# Patient Record
Sex: FEMALE | Race: AMERICAN INDIAN OR ALASKA NATIVE | NOT HISPANIC OR LATINO | URBAN - METROPOLITAN AREA
[De-identification: names, ages, dates, MRNs, and addresses within clinical notes are randomized per-mention and may not be internally consistent; named-entity substitution may affect disease eponyms.]

---

## 2020-03-01 ENCOUNTER — OUTPATIENT (OUTPATIENT)
Dept: OUTPATIENT SERVICES | Facility: HOSPITAL | Age: 85
LOS: 1 days | End: 2020-03-01
Payer: MEDICARE

## 2020-03-01 DIAGNOSIS — Z90.5 ACQUIRED ABSENCE OF KIDNEY: Chronic | ICD-10-CM

## 2020-03-01 PROCEDURE — G9001: CPT

## 2020-03-25 ENCOUNTER — INPATIENT (INPATIENT)
Facility: HOSPITAL | Age: 85
LOS: 8 days | Discharge: NURSING, REHAB OR LONG-TERM CARE | DRG: 871 | End: 2020-04-03
Attending: INTERNAL MEDICINE | Admitting: INTERNAL MEDICINE
Payer: MEDICARE

## 2020-03-25 VITALS
DIASTOLIC BLOOD PRESSURE: 80 MMHG | OXYGEN SATURATION: 100 % | RESPIRATION RATE: 17 BRPM | SYSTOLIC BLOOD PRESSURE: 160 MMHG | TEMPERATURE: 102 F | HEART RATE: 115 BPM | WEIGHT: 119.93 LBS

## 2020-03-25 DIAGNOSIS — Z90.5 ACQUIRED ABSENCE OF KIDNEY: Chronic | ICD-10-CM

## 2020-03-25 LAB
ALBUMIN SERPL ELPH-MCNC: 2.6 G/DL — LOW (ref 3.5–5)
ALP SERPL-CCNC: 73 U/L — SIGNIFICANT CHANGE UP (ref 40–120)
ALT FLD-CCNC: 20 U/L DA — SIGNIFICANT CHANGE UP (ref 10–60)
ANION GAP SERPL CALC-SCNC: 8 MMOL/L — SIGNIFICANT CHANGE UP (ref 5–17)
APTT BLD: 30.7 SEC — SIGNIFICANT CHANGE UP (ref 27.5–36.3)
AST SERPL-CCNC: 34 U/L — SIGNIFICANT CHANGE UP (ref 10–40)
BASOPHILS # BLD AUTO: 0.01 K/UL — SIGNIFICANT CHANGE UP (ref 0–0.2)
BASOPHILS NFR BLD AUTO: 0.2 % — SIGNIFICANT CHANGE UP (ref 0–2)
BILIRUB SERPL-MCNC: 0.3 MG/DL — SIGNIFICANT CHANGE UP (ref 0.2–1.2)
BUN SERPL-MCNC: 19 MG/DL — HIGH (ref 7–18)
CALCIUM SERPL-MCNC: 8.6 MG/DL — SIGNIFICANT CHANGE UP (ref 8.4–10.5)
CHLORIDE SERPL-SCNC: 104 MMOL/L — SIGNIFICANT CHANGE UP (ref 96–108)
CO2 SERPL-SCNC: 25 MMOL/L — SIGNIFICANT CHANGE UP (ref 22–31)
CREAT SERPL-MCNC: 1.51 MG/DL — HIGH (ref 0.5–1.3)
EOSINOPHIL # BLD AUTO: 0 K/UL — SIGNIFICANT CHANGE UP (ref 0–0.5)
EOSINOPHIL NFR BLD AUTO: 0 % — SIGNIFICANT CHANGE UP (ref 0–6)
FLU A RESULT: SIGNIFICANT CHANGE UP
FLU A RESULT: SIGNIFICANT CHANGE UP
FLUAV AG NPH QL: SIGNIFICANT CHANGE UP
FLUBV AG NPH QL: SIGNIFICANT CHANGE UP
GLUCOSE SERPL-MCNC: 202 MG/DL — HIGH (ref 70–99)
HCT VFR BLD CALC: 34.2 % — LOW (ref 34.5–45)
HGB BLD-MCNC: 11.1 G/DL — LOW (ref 11.5–15.5)
IMM GRANULOCYTES NFR BLD AUTO: 0.4 % — SIGNIFICANT CHANGE UP (ref 0–1.5)
INR BLD: 0.99 RATIO — SIGNIFICANT CHANGE UP (ref 0.88–1.16)
LACTATE SERPL-SCNC: 2.3 MMOL/L — HIGH (ref 0.7–2)
LYMPHOCYTES # BLD AUTO: 0.89 K/UL — LOW (ref 1–3.3)
LYMPHOCYTES # BLD AUTO: 17.4 % — SIGNIFICANT CHANGE UP (ref 13–44)
MCHC RBC-ENTMCNC: 29.3 PG — SIGNIFICANT CHANGE UP (ref 27–34)
MCHC RBC-ENTMCNC: 32.5 GM/DL — SIGNIFICANT CHANGE UP (ref 32–36)
MCV RBC AUTO: 90.2 FL — SIGNIFICANT CHANGE UP (ref 80–100)
MONOCYTES # BLD AUTO: 0.44 K/UL — SIGNIFICANT CHANGE UP (ref 0–0.9)
MONOCYTES NFR BLD AUTO: 8.6 % — SIGNIFICANT CHANGE UP (ref 2–14)
NEUTROPHILS # BLD AUTO: 3.75 K/UL — SIGNIFICANT CHANGE UP (ref 1.8–7.4)
NEUTROPHILS NFR BLD AUTO: 73.4 % — SIGNIFICANT CHANGE UP (ref 43–77)
NRBC # BLD: 0 /100 WBCS — SIGNIFICANT CHANGE UP (ref 0–0)
NT-PROBNP SERPL-SCNC: 716 PG/ML — HIGH (ref 0–450)
PLATELET # BLD AUTO: 243 K/UL — SIGNIFICANT CHANGE UP (ref 150–400)
POTASSIUM SERPL-MCNC: 4 MMOL/L — SIGNIFICANT CHANGE UP (ref 3.5–5.3)
POTASSIUM SERPL-SCNC: 4 MMOL/L — SIGNIFICANT CHANGE UP (ref 3.5–5.3)
PROT SERPL-MCNC: 6.5 G/DL — SIGNIFICANT CHANGE UP (ref 6–8.3)
PROTHROM AB SERPL-ACNC: 11.2 SEC — SIGNIFICANT CHANGE UP (ref 10–12.9)
RBC # BLD: 3.79 M/UL — LOW (ref 3.8–5.2)
RBC # FLD: 13.3 % — SIGNIFICANT CHANGE UP (ref 10.3–14.5)
RSV RESULT: SIGNIFICANT CHANGE UP
RSV RNA RESP QL NAA+PROBE: SIGNIFICANT CHANGE UP
SODIUM SERPL-SCNC: 137 MMOL/L — SIGNIFICANT CHANGE UP (ref 135–145)
TROPONIN I SERPL-MCNC: 0.01 NG/ML — SIGNIFICANT CHANGE UP (ref 0–0.04)
WBC # BLD: 5.11 K/UL — SIGNIFICANT CHANGE UP (ref 3.8–10.5)
WBC # FLD AUTO: 5.11 K/UL — SIGNIFICANT CHANGE UP (ref 3.8–10.5)

## 2020-03-25 RX ORDER — CEFTRIAXONE 500 MG/1
1000 INJECTION, POWDER, FOR SOLUTION INTRAMUSCULAR; INTRAVENOUS ONCE
Refills: 0 | Status: DISCONTINUED | OUTPATIENT
Start: 2020-03-25 | End: 2020-03-25

## 2020-03-25 RX ORDER — ACETAMINOPHEN 500 MG
650 TABLET ORAL ONCE
Refills: 0 | Status: COMPLETED | OUTPATIENT
Start: 2020-03-25 | End: 2020-03-26

## 2020-03-25 RX ORDER — AZITHROMYCIN 500 MG/1
500 TABLET, FILM COATED ORAL ONCE
Refills: 0 | Status: DISCONTINUED | OUTPATIENT
Start: 2020-03-25 | End: 2020-03-25

## 2020-03-25 RX ORDER — CEFEPIME 1 G/1
2000 INJECTION, POWDER, FOR SOLUTION INTRAMUSCULAR; INTRAVENOUS ONCE
Refills: 0 | Status: COMPLETED | OUTPATIENT
Start: 2020-03-25 | End: 2020-03-25

## 2020-03-25 RX ORDER — VANCOMYCIN HCL 1 G
1000 VIAL (EA) INTRAVENOUS ONCE
Refills: 0 | Status: COMPLETED | OUTPATIENT
Start: 2020-03-25 | End: 2020-03-25

## 2020-03-25 RX ORDER — SODIUM CHLORIDE 9 MG/ML
1700 INJECTION INTRAMUSCULAR; INTRAVENOUS; SUBCUTANEOUS ONCE
Refills: 0 | Status: COMPLETED | OUTPATIENT
Start: 2020-03-25 | End: 2020-03-25

## 2020-03-25 NOTE — ED PROVIDER NOTE - OBJECTIVE STATEMENT
86yoF with h/o HTN, DM on insulin, dementia, MI 4 yrs ago s/p stent, one kidney s/p nephrectomy in her 50's, presents for weakness and fever. D/w grandson Bertrand Paulino who reports she fell down a few days ago and landed on her bottom, daughter heard thud and seemed fine, +head trauma, no LOC, but having trouble walking since then. Fever on Monday, then today fever came back and today she has been febrile to 101 (no Tylenol), shaky, not taking her medications, poorly verbal. Her aide called out sick starting Saturday. Denies new cough (chronic), vomiting since Monday, diarrhea. Pt herself is nonverbal and does not provide hx.  Meds: ASA, Plavix, Januvia, metformin, statin, metoprolol, diltiazem, losartan, Synthroid, omeprazole, iron

## 2020-03-25 NOTE — ED PROVIDER NOTE - CRITICAL CARE PROVIDED
documentation/direct patient care (not related to procedure)/telephone consultation with the patient's family/interpretation of diagnostic studies/additional history taking

## 2020-03-25 NOTE — ED PROVIDER NOTE - CLINICAL SUMMARY MEDICAL DECISION MAKING FREE TEXT BOX
Abdomen benign with low suspicion for acute process. Pt LYNNE spontaneously and no meningeal signs with low suspicion for meningitis. No e/o cellulitis, endocarditis. UA pending. Abdomen benign with low suspicion for acute process. Pt LYNNE spontaneously and no meningeal signs with low suspicion for meningitis. No e/o cellulitis, endocarditis. UA pending. Given fluids, abx. Signed off care to Dr. IWONA Renteria who will f/u CT's and reassess.

## 2020-03-25 NOTE — ED PROVIDER NOTE - PHYSICAL EXAMINATION
Febrile, hemodynamically stable, saturating well on RA  NAD, laying sleeping in bed, no WOB  Head NCAT  EOMI grossly, anicteric  MM dry  RRR, nml S1/S2, no m/r/g  Lungs CTAB, no w/r/r  Abd soft, NT, ND, nml BS, no rebound or guarding  Sleeping, arousable, intermittently verbal but not coordinated/directed, CN's 3-12 grossly intact  LYNNE spontaneously, no leg cyanosis or edema  Skin warm, dry, no rashes or hives

## 2020-03-25 NOTE — ED PROVIDER NOTE - PROGRESS NOTE DETAILS
Signed off care to Dr. IWONA Renteria who will f/u CT's and reassess. Myra: ct signifiacnt for atypical pna. will admit.

## 2020-03-26 DIAGNOSIS — J18.9 PNEUMONIA, UNSPECIFIED ORGANISM: ICD-10-CM

## 2020-03-26 DIAGNOSIS — I25.10 ATHEROSCLEROTIC HEART DISEASE OF NATIVE CORONARY ARTERY WITHOUT ANGINA PECTORIS: ICD-10-CM

## 2020-03-26 DIAGNOSIS — E03.9 HYPOTHYROIDISM, UNSPECIFIED: ICD-10-CM

## 2020-03-26 DIAGNOSIS — R29.6 REPEATED FALLS: ICD-10-CM

## 2020-03-26 DIAGNOSIS — A41.9 SEPSIS, UNSPECIFIED ORGANISM: ICD-10-CM

## 2020-03-26 DIAGNOSIS — F03.90 UNSPECIFIED DEMENTIA WITHOUT BEHAVIORAL DISTURBANCE: ICD-10-CM

## 2020-03-26 DIAGNOSIS — N17.9 ACUTE KIDNEY FAILURE, UNSPECIFIED: ICD-10-CM

## 2020-03-26 DIAGNOSIS — I10 ESSENTIAL (PRIMARY) HYPERTENSION: ICD-10-CM

## 2020-03-26 DIAGNOSIS — E11.69 TYPE 2 DIABETES MELLITUS WITH OTHER SPECIFIED COMPLICATION: ICD-10-CM

## 2020-03-26 LAB
ANION GAP SERPL CALC-SCNC: 9 MMOL/L — SIGNIFICANT CHANGE UP (ref 5–17)
APPEARANCE UR: ABNORMAL
BILIRUB UR-MCNC: NEGATIVE — SIGNIFICANT CHANGE UP
BUN SERPL-MCNC: 18 MG/DL — SIGNIFICANT CHANGE UP (ref 7–18)
CALCIUM SERPL-MCNC: 8.7 MG/DL — SIGNIFICANT CHANGE UP (ref 8.4–10.5)
CHLORIDE SERPL-SCNC: 107 MMOL/L — SIGNIFICANT CHANGE UP (ref 96–108)
CO2 SERPL-SCNC: 22 MMOL/L — SIGNIFICANT CHANGE UP (ref 22–31)
COLOR SPEC: YELLOW — SIGNIFICANT CHANGE UP
CREAT SERPL-MCNC: 1.23 MG/DL — SIGNIFICANT CHANGE UP (ref 0.5–1.3)
CRP SERPL-MCNC: 3.44 MG/DL — HIGH (ref 0–0.4)
DIFF PNL FLD: NEGATIVE — SIGNIFICANT CHANGE UP
ERYTHROCYTE [SEDIMENTATION RATE] IN BLOOD: 39 MM/HR — HIGH (ref 0–20)
FERRITIN SERPL-MCNC: 809 NG/ML — HIGH (ref 15–150)
GLUCOSE BLDC GLUCOMTR-MCNC: 137 MG/DL — HIGH (ref 70–99)
GLUCOSE BLDC GLUCOMTR-MCNC: 173 MG/DL — HIGH (ref 70–99)
GLUCOSE BLDC GLUCOMTR-MCNC: 179 MG/DL — HIGH (ref 70–99)
GLUCOSE BLDC GLUCOMTR-MCNC: 215 MG/DL — HIGH (ref 70–99)
GLUCOSE SERPL-MCNC: 171 MG/DL — HIGH (ref 70–99)
GLUCOSE UR QL: 50 MG/DL
HCT VFR BLD CALC: 35.2 % — SIGNIFICANT CHANGE UP (ref 34.5–45)
HGB BLD-MCNC: 11.6 G/DL — SIGNIFICANT CHANGE UP (ref 11.5–15.5)
KETONES UR-MCNC: ABNORMAL
LACTATE SERPL-SCNC: 1.8 MMOL/L — SIGNIFICANT CHANGE UP (ref 0.7–2)
LDH SERPL L TO P-CCNC: 225 U/L — SIGNIFICANT CHANGE UP (ref 120–225)
LEUKOCYTE ESTERASE UR-ACNC: NEGATIVE — SIGNIFICANT CHANGE UP
MAGNESIUM SERPL-MCNC: 2 MG/DL — SIGNIFICANT CHANGE UP (ref 1.6–2.6)
MCHC RBC-ENTMCNC: 29.1 PG — SIGNIFICANT CHANGE UP (ref 27–34)
MCHC RBC-ENTMCNC: 33 GM/DL — SIGNIFICANT CHANGE UP (ref 32–36)
MCV RBC AUTO: 88.2 FL — SIGNIFICANT CHANGE UP (ref 80–100)
NITRITE UR-MCNC: NEGATIVE — SIGNIFICANT CHANGE UP
NRBC # BLD: 0 /100 WBCS — SIGNIFICANT CHANGE UP (ref 0–0)
PH UR: 6 — SIGNIFICANT CHANGE UP (ref 5–8)
PHOSPHATE SERPL-MCNC: 3.2 MG/DL — SIGNIFICANT CHANGE UP (ref 2.5–4.5)
PLATELET # BLD AUTO: 246 K/UL — SIGNIFICANT CHANGE UP (ref 150–400)
POTASSIUM SERPL-MCNC: 4.2 MMOL/L — SIGNIFICANT CHANGE UP (ref 3.5–5.3)
POTASSIUM SERPL-SCNC: 4.2 MMOL/L — SIGNIFICANT CHANGE UP (ref 3.5–5.3)
PROT UR-MCNC: 100
RBC # BLD: 3.99 M/UL — SIGNIFICANT CHANGE UP (ref 3.8–5.2)
RBC # FLD: 13.2 % — SIGNIFICANT CHANGE UP (ref 10.3–14.5)
SARS-COV-2 RNA SPEC QL NAA+PROBE: DETECTED
SODIUM SERPL-SCNC: 138 MMOL/L — SIGNIFICANT CHANGE UP (ref 135–145)
SP GR SPEC: 1.01 — SIGNIFICANT CHANGE UP (ref 1.01–1.02)
TSH SERPL-MCNC: 0.04 UU/ML — LOW (ref 0.34–4.82)
UROBILINOGEN FLD QL: NEGATIVE — SIGNIFICANT CHANGE UP
WBC # BLD: 5.4 K/UL — SIGNIFICANT CHANGE UP (ref 3.8–10.5)
WBC # FLD AUTO: 5.4 K/UL — SIGNIFICANT CHANGE UP (ref 3.8–10.5)

## 2020-03-26 PROCEDURE — 99285 EMERGENCY DEPT VISIT HI MDM: CPT | Mod: CS

## 2020-03-26 PROCEDURE — 99223 1ST HOSP IP/OBS HIGH 75: CPT

## 2020-03-26 PROCEDURE — 70450 CT HEAD/BRAIN W/O DYE: CPT | Mod: 26

## 2020-03-26 PROCEDURE — 71250 CT THORAX DX C-: CPT | Mod: 26

## 2020-03-26 PROCEDURE — 71045 X-RAY EXAM CHEST 1 VIEW: CPT | Mod: 26

## 2020-03-26 PROCEDURE — 72125 CT NECK SPINE W/O DYE: CPT | Mod: 26

## 2020-03-26 PROCEDURE — 74176 CT ABD & PELVIS W/O CONTRAST: CPT | Mod: 26

## 2020-03-26 RX ORDER — ASCORBIC ACID 60 MG
500 TABLET,CHEWABLE ORAL
Refills: 0 | Status: DISCONTINUED | OUTPATIENT
Start: 2020-03-26 | End: 2020-04-03

## 2020-03-26 RX ORDER — LEVOTHYROXINE SODIUM 125 MCG
75 TABLET ORAL DAILY
Refills: 0 | Status: DISCONTINUED | OUTPATIENT
Start: 2020-03-26 | End: 2020-03-26

## 2020-03-26 RX ORDER — CEFTRIAXONE 500 MG/1
INJECTION, POWDER, FOR SOLUTION INTRAMUSCULAR; INTRAVENOUS
Refills: 0 | Status: DISCONTINUED | OUTPATIENT
Start: 2020-03-26 | End: 2020-03-29

## 2020-03-26 RX ORDER — LOSARTAN POTASSIUM 100 MG/1
50 TABLET, FILM COATED ORAL DAILY
Refills: 0 | Status: DISCONTINUED | OUTPATIENT
Start: 2020-03-26 | End: 2020-04-03

## 2020-03-26 RX ORDER — AZITHROMYCIN 500 MG/1
500 TABLET, FILM COATED ORAL EVERY 24 HOURS
Refills: 0 | Status: COMPLETED | OUTPATIENT
Start: 2020-03-26 | End: 2020-03-29

## 2020-03-26 RX ORDER — OMEPRAZOLE 10 MG/1
1 CAPSULE, DELAYED RELEASE ORAL
Qty: 0 | Refills: 0 | DISCHARGE

## 2020-03-26 RX ORDER — METOPROLOL TARTRATE 50 MG
50 TABLET ORAL DAILY
Refills: 0 | Status: DISCONTINUED | OUTPATIENT
Start: 2020-03-26 | End: 2020-03-26

## 2020-03-26 RX ORDER — PANTOPRAZOLE SODIUM 20 MG/1
40 TABLET, DELAYED RELEASE ORAL
Refills: 0 | Status: DISCONTINUED | OUTPATIENT
Start: 2020-03-26 | End: 2020-03-26

## 2020-03-26 RX ORDER — PANTOPRAZOLE SODIUM 20 MG/1
40 TABLET, DELAYED RELEASE ORAL DAILY
Refills: 0 | Status: DISCONTINUED | OUTPATIENT
Start: 2020-03-26 | End: 2020-04-03

## 2020-03-26 RX ORDER — DONEPEZIL HYDROCHLORIDE 10 MG/1
5 TABLET, FILM COATED ORAL AT BEDTIME
Refills: 0 | Status: DISCONTINUED | OUTPATIENT
Start: 2020-03-26 | End: 2020-04-03

## 2020-03-26 RX ORDER — ATORVASTATIN CALCIUM 80 MG/1
20 TABLET, FILM COATED ORAL AT BEDTIME
Refills: 0 | Status: DISCONTINUED | OUTPATIENT
Start: 2020-03-26 | End: 2020-04-03

## 2020-03-26 RX ORDER — DONEPEZIL HYDROCHLORIDE 10 MG/1
1 TABLET, FILM COATED ORAL
Qty: 0 | Refills: 0 | DISCHARGE

## 2020-03-26 RX ORDER — SODIUM CHLORIDE 9 MG/ML
1000 INJECTION, SOLUTION INTRAVENOUS
Refills: 0 | Status: DISCONTINUED | OUTPATIENT
Start: 2020-03-26 | End: 2020-03-30

## 2020-03-26 RX ORDER — LEVOTHYROXINE SODIUM 125 MCG
88 TABLET ORAL DAILY
Refills: 0 | Status: DISCONTINUED | OUTPATIENT
Start: 2020-03-26 | End: 2020-03-26

## 2020-03-26 RX ORDER — LATANOPROST 0.05 MG/ML
1 SOLUTION/ DROPS OPHTHALMIC; TOPICAL AT BEDTIME
Refills: 0 | Status: DISCONTINUED | OUTPATIENT
Start: 2020-03-26 | End: 2020-04-03

## 2020-03-26 RX ORDER — DORZOLAMIDE HYDROCHLORIDE TIMOLOL MALEATE 20; 5 MG/ML; MG/ML
1 SOLUTION/ DROPS OPHTHALMIC
Refills: 0 | Status: DISCONTINUED | OUTPATIENT
Start: 2020-03-26 | End: 2020-04-03

## 2020-03-26 RX ORDER — LEVOTHYROXINE SODIUM 125 MCG
100 TABLET ORAL DAILY
Refills: 0 | Status: DISCONTINUED | OUTPATIENT
Start: 2020-03-26 | End: 2020-03-26

## 2020-03-26 RX ORDER — CLOPIDOGREL BISULFATE 75 MG/1
75 TABLET, FILM COATED ORAL DAILY
Refills: 0 | Status: DISCONTINUED | OUTPATIENT
Start: 2020-03-26 | End: 2020-04-03

## 2020-03-26 RX ORDER — HEPARIN SODIUM 5000 [USP'U]/ML
5000 INJECTION INTRAVENOUS; SUBCUTANEOUS EVERY 8 HOURS
Refills: 0 | Status: DISCONTINUED | OUTPATIENT
Start: 2020-03-26 | End: 2020-04-03

## 2020-03-26 RX ORDER — CEFTRIAXONE 500 MG/1
1000 INJECTION, POWDER, FOR SOLUTION INTRAMUSCULAR; INTRAVENOUS EVERY 24 HOURS
Refills: 0 | Status: DISCONTINUED | OUTPATIENT
Start: 2020-03-27 | End: 2020-03-29

## 2020-03-26 RX ORDER — METOPROLOL TARTRATE 50 MG
5 TABLET ORAL EVERY 6 HOURS
Refills: 0 | Status: DISCONTINUED | OUTPATIENT
Start: 2020-03-26 | End: 2020-04-03

## 2020-03-26 RX ORDER — LOSARTAN POTASSIUM 100 MG/1
50 TABLET, FILM COATED ORAL DAILY
Refills: 0 | Status: DISCONTINUED | OUTPATIENT
Start: 2020-03-26 | End: 2020-03-26

## 2020-03-26 RX ORDER — LEVOTHYROXINE SODIUM 125 MCG
45 TABLET ORAL AT BEDTIME
Refills: 0 | Status: DISCONTINUED | OUTPATIENT
Start: 2020-03-26 | End: 2020-03-27

## 2020-03-26 RX ORDER — CEFTRIAXONE 500 MG/1
1000 INJECTION, POWDER, FOR SOLUTION INTRAMUSCULAR; INTRAVENOUS ONCE
Refills: 0 | Status: COMPLETED | OUTPATIENT
Start: 2020-03-26 | End: 2020-03-26

## 2020-03-26 RX ORDER — AMLODIPINE BESYLATE 2.5 MG/1
5 TABLET ORAL DAILY
Refills: 0 | Status: DISCONTINUED | OUTPATIENT
Start: 2020-03-26 | End: 2020-03-26

## 2020-03-26 RX ORDER — INSULIN LISPRO 100/ML
VIAL (ML) SUBCUTANEOUS
Refills: 0 | Status: DISCONTINUED | OUTPATIENT
Start: 2020-03-26 | End: 2020-04-03

## 2020-03-26 RX ORDER — AZITHROMYCIN 500 MG/1
500 TABLET, FILM COATED ORAL DAILY
Refills: 0 | Status: DISCONTINUED | OUTPATIENT
Start: 2020-03-26 | End: 2020-03-26

## 2020-03-26 RX ADMIN — SODIUM CHLORIDE 1700 MILLILITER(S): 9 INJECTION INTRAMUSCULAR; INTRAVENOUS; SUBCUTANEOUS at 01:36

## 2020-03-26 RX ADMIN — Medication 2: at 17:36

## 2020-03-26 RX ADMIN — Medication 5 MILLIGRAM(S): at 19:07

## 2020-03-26 RX ADMIN — Medication 1: at 09:01

## 2020-03-26 RX ADMIN — CEFTRIAXONE 100 MILLIGRAM(S): 500 INJECTION, POWDER, FOR SOLUTION INTRAMUSCULAR; INTRAVENOUS at 08:49

## 2020-03-26 RX ADMIN — Medication 5 MILLIGRAM(S): at 14:50

## 2020-03-26 RX ADMIN — AZITHROMYCIN 255 MILLIGRAM(S): 500 TABLET, FILM COATED ORAL at 14:49

## 2020-03-26 RX ADMIN — HEPARIN SODIUM 5000 UNIT(S): 5000 INJECTION INTRAVENOUS; SUBCUTANEOUS at 21:44

## 2020-03-26 RX ADMIN — Medication 650 MILLIGRAM(S): at 02:30

## 2020-03-26 RX ADMIN — CEFEPIME 100 MILLIGRAM(S): 1 INJECTION, POWDER, FOR SOLUTION INTRAMUSCULAR; INTRAVENOUS at 01:36

## 2020-03-26 RX ADMIN — Medication 250 MILLIGRAM(S): at 01:37

## 2020-03-26 RX ADMIN — Medication 45 MICROGRAM(S): at 23:20

## 2020-03-26 RX ADMIN — Medication 650 MILLIGRAM(S): at 01:37

## 2020-03-26 NOTE — H&P ADULT - NSHPPHYSICALEXAM_GEN_ALL_CORE
Vital Signs Last 24 Hrs  T(C): 37.1 (26 Mar 2020 06:24), Max: 38.9 (25 Mar 2020 20:07)  T(F): 98.8 (26 Mar 2020 06:24), Max: 102 (25 Mar 2020 20:07)  HR: 96 (26 Mar 2020 06:24) (96 - 115)  BP: 143/66 (26 Mar 2020 06:24) (143/66 - 160/80)  BP(mean): --  RR: 18 (26 Mar 2020 06:24) (17 - 18)  SpO2: 99% (26 Mar 2020 06:24) (96% - 100%)    GEN: NAD  HEENT- normocephalic; mouth moist  CVS- S1S2+  LUNGS- clear to auscultation; no wheezing  ABD: Soft , nontender, nondistended, Bowel sounds are present  EXTREMITY: no calf tenderness, no cyanosis, no edema  NEURO: AAOx3; non focal neurologic exam; cranial nerves grossly intact  PSYCH: normal affect and behavior  BACK: no swelling or mass;   VASCULAR: ++ distal peripheral pulses  SKIN: warm and dry.

## 2020-03-26 NOTE — H&P ADULT - PROBLEM SELECTOR PLAN 1
1. CAP- Empiric abx, f/u COVID19, ferritin, LDH, CRP, RVP. Will defer ID and pulmonology consults to day team. Appreciate cardiology consult.

## 2020-03-26 NOTE — PATIENT PROFILE ADULT - LANGUAGE ASSISTANCE NEEDED
No-Patient/Caregiver offered and refused free interpretation services./Pt. is confused. Unable to assess

## 2020-03-26 NOTE — H&P ADULT - NSICDXPASTMEDICALHX_GEN_ALL_CORE_FT
PAST MEDICAL HISTORY:  DM (diabetes mellitus)     HTN (hypertension)     Hypothyroid     Stented coronary artery

## 2020-03-26 NOTE — H&P ADULT - PROBLEM SELECTOR PLAN 9
Avoid anticholinergics, benzodiazepines, limit lines/tubes/tethers/restraints, encourage frequent reorientation/reassurance by staff, encourage good sleep hygiene, adequate lighting

## 2020-03-26 NOTE — H&P ADULT - NSHPLABSRESULTS_GEN_ALL_CORE
Labs Reviewed:                         11.1   5.11  )-----------( 243      ( 25 Mar 2020 22:33 )             34.2         137  |  104  |  19<H>  ----------------------------<  202<H>  4.0   |  25  |  1.51<H>    Ca    8.6      25 Mar 2020 22:33    TPro  6.5  /  Alb  2.6<L>  /  TBili  0.3  /  DBili  x   /  AST  34  /  ALT  20  /  AlkPhos  73      CARDIAC MARKERS ( 25 Mar 2020 22:33 )  0.015 ng/mL / x     / x     / x     / x          Urinalysis Basic - ( 26 Mar 2020 05:40 )    Color: Yellow / Appearance: Slightly Turbid / S.015 / pH: x  Gluc: x / Ketone: Trace  / Bili: Negative / Urobili: Negative   Blood: x / Protein: 100 / Nitrite: Negative   Leuk Esterase: Negative / RBC: 0-2 /HPF / WBC 0-2 /HPF   Sq Epi: x / Non Sq Epi: Few /HPF / Bacteria: Moderate /HPF      PT/INR - ( 25 Mar 2020 22:33 )   PT: 11.2 sec;   INR: 0.99 ratio         PTT - ( 25 Mar 2020 22:33 )  PTT:30.7 sec  BNP: Serum Pro-Brain Natriuretic Peptide: 716 pg/mL ( @ 22:33)    MEDICATIONS  (STANDING):  amLODIPine   Tablet 5 milliGRAM(s) Oral daily  atorvastatin 20 milliGRAM(s) Oral at bedtime  azithromycin   Tablet 500 milliGRAM(s) Oral daily  cefTRIAXone   IVPB 1000 milliGRAM(s) IV Intermittent once  cefTRIAXone   IVPB      clopidogrel Tablet 75 milliGRAM(s) Oral daily  dorzolamide 2%/timolol 0.5% Ophthalmic Solution 1 Drop(s) Both EYES two times a day  heparin  Injectable 5000 Unit(s) SubCutaneous every 8 hours  insulin lispro (HumaLOG) corrective regimen sliding scale   SubCutaneous three times a day before meals  latanoprost 0.005% Ophthalmic Solution 1 Drop(s) Both EYES at bedtime  levothyroxine 100 MICROGram(s) Oral daily  losartan 50 milliGRAM(s) Oral daily  metoprolol succinate ER 50 milliGRAM(s) Oral daily  pantoprazole    Tablet 40 milliGRAM(s) Oral before breakfast

## 2020-03-26 NOTE — H&P ADULT - HISTORY OF PRESENT ILLNESS
87 y/o F with CAD s/p PCI, DM, dementia, glaucoma presenting with generalized weakness and recurrent falls. Patient not interacting with me on exam, hx taken from daughter and chart review as result. Patient has had generalized weakness with recurrent falls. Daughter also noticed shaking chills but denied fever specifically, however febrile on admission. No new medications, denies coughing, chest pain or change in mental status. No bleeding or other acute symptoms.

## 2020-03-27 DIAGNOSIS — Z71.89 OTHER SPECIFIED COUNSELING: ICD-10-CM

## 2020-03-27 DIAGNOSIS — Z29.9 ENCOUNTER FOR PROPHYLACTIC MEASURES, UNSPECIFIED: ICD-10-CM

## 2020-03-27 LAB
ANION GAP SERPL CALC-SCNC: 6 MMOL/L — SIGNIFICANT CHANGE UP (ref 5–17)
BUN SERPL-MCNC: 14 MG/DL — SIGNIFICANT CHANGE UP (ref 7–18)
CALCIUM SERPL-MCNC: 8.3 MG/DL — LOW (ref 8.4–10.5)
CHLORIDE SERPL-SCNC: 107 MMOL/L — SIGNIFICANT CHANGE UP (ref 96–108)
CO2 SERPL-SCNC: 24 MMOL/L — SIGNIFICANT CHANGE UP (ref 22–31)
CREAT SERPL-MCNC: 1.07 MG/DL — SIGNIFICANT CHANGE UP (ref 0.5–1.3)
CULTURE RESULTS: NO GROWTH — SIGNIFICANT CHANGE UP
GLUCOSE BLDC GLUCOMTR-MCNC: 147 MG/DL — HIGH (ref 70–99)
GLUCOSE BLDC GLUCOMTR-MCNC: 185 MG/DL — HIGH (ref 70–99)
GLUCOSE BLDC GLUCOMTR-MCNC: 204 MG/DL — HIGH (ref 70–99)
GLUCOSE BLDC GLUCOMTR-MCNC: 204 MG/DL — HIGH (ref 70–99)
GLUCOSE BLDC GLUCOMTR-MCNC: 207 MG/DL — HIGH (ref 70–99)
GLUCOSE SERPL-MCNC: 217 MG/DL — HIGH (ref 70–99)
HBA1C BLD-MCNC: 7.5 % — HIGH (ref 4–5.6)
HCT VFR BLD CALC: 33.4 % — LOW (ref 34.5–45)
HGB BLD-MCNC: 11 G/DL — LOW (ref 11.5–15.5)
MAGNESIUM SERPL-MCNC: 1.7 MG/DL — SIGNIFICANT CHANGE UP (ref 1.6–2.6)
MCHC RBC-ENTMCNC: 29.6 PG — SIGNIFICANT CHANGE UP (ref 27–34)
MCHC RBC-ENTMCNC: 32.9 GM/DL — SIGNIFICANT CHANGE UP (ref 32–36)
MCV RBC AUTO: 89.8 FL — SIGNIFICANT CHANGE UP (ref 80–100)
NRBC # BLD: 0 /100 WBCS — SIGNIFICANT CHANGE UP (ref 0–0)
PHOSPHATE SERPL-MCNC: 2.7 MG/DL — SIGNIFICANT CHANGE UP (ref 2.5–4.5)
PLATELET # BLD AUTO: 228 K/UL — SIGNIFICANT CHANGE UP (ref 150–400)
POTASSIUM SERPL-MCNC: 3.9 MMOL/L — SIGNIFICANT CHANGE UP (ref 3.5–5.3)
POTASSIUM SERPL-SCNC: 3.9 MMOL/L — SIGNIFICANT CHANGE UP (ref 3.5–5.3)
RBC # BLD: 3.72 M/UL — LOW (ref 3.8–5.2)
RBC # FLD: 13.1 % — SIGNIFICANT CHANGE UP (ref 10.3–14.5)
SODIUM SERPL-SCNC: 137 MMOL/L — SIGNIFICANT CHANGE UP (ref 135–145)
SPECIMEN SOURCE: SIGNIFICANT CHANGE UP
T3 SERPL-MCNC: 77 NG/DL — LOW (ref 80–200)
T4 AB SER-ACNC: 10 UG/DL — SIGNIFICANT CHANGE UP (ref 4.6–12)
WBC # BLD: 4.79 K/UL — SIGNIFICANT CHANGE UP (ref 3.8–10.5)
WBC # FLD AUTO: 4.79 K/UL — SIGNIFICANT CHANGE UP (ref 3.8–10.5)

## 2020-03-27 RX ORDER — SODIUM CHLORIDE 9 MG/ML
1000 INJECTION, SOLUTION INTRAVENOUS
Refills: 0 | Status: DISCONTINUED | OUTPATIENT
Start: 2020-03-27 | End: 2020-03-30

## 2020-03-27 RX ORDER — LEVOTHYROXINE SODIUM 125 MCG
100 TABLET ORAL DAILY
Refills: 0 | Status: DISCONTINUED | OUTPATIENT
Start: 2020-03-27 | End: 2020-04-03

## 2020-03-27 RX ORDER — INSULIN LISPRO 100/ML
2 VIAL (ML) SUBCUTANEOUS ONCE
Refills: 0 | Status: COMPLETED | OUTPATIENT
Start: 2020-03-27 | End: 2020-03-27

## 2020-03-27 RX ADMIN — CEFTRIAXONE 100 MILLIGRAM(S): 500 INJECTION, POWDER, FOR SOLUTION INTRAMUSCULAR; INTRAVENOUS at 06:26

## 2020-03-27 RX ADMIN — PANTOPRAZOLE SODIUM 40 MILLIGRAM(S): 20 TABLET, DELAYED RELEASE ORAL at 11:37

## 2020-03-27 RX ADMIN — HEPARIN SODIUM 5000 UNIT(S): 5000 INJECTION INTRAVENOUS; SUBCUTANEOUS at 06:13

## 2020-03-27 RX ADMIN — Medication 5 MILLIGRAM(S): at 00:21

## 2020-03-27 RX ADMIN — Medication 5 MILLIGRAM(S): at 17:13

## 2020-03-27 RX ADMIN — HEPARIN SODIUM 5000 UNIT(S): 5000 INJECTION INTRAVENOUS; SUBCUTANEOUS at 21:46

## 2020-03-27 RX ADMIN — Medication 2 UNIT(S): at 22:50

## 2020-03-27 RX ADMIN — Medication 2: at 09:07

## 2020-03-27 RX ADMIN — Medication 5 MILLIGRAM(S): at 06:13

## 2020-03-27 RX ADMIN — HEPARIN SODIUM 5000 UNIT(S): 5000 INJECTION INTRAVENOUS; SUBCUTANEOUS at 13:02

## 2020-03-27 RX ADMIN — SODIUM CHLORIDE 60 MILLILITER(S): 9 INJECTION, SOLUTION INTRAVENOUS at 09:07

## 2020-03-27 RX ADMIN — Medication 2: at 11:37

## 2020-03-27 RX ADMIN — Medication 5 MILLIGRAM(S): at 11:36

## 2020-03-27 RX ADMIN — AZITHROMYCIN 255 MILLIGRAM(S): 500 TABLET, FILM COATED ORAL at 13:02

## 2020-03-27 RX ADMIN — Medication 5 MILLIGRAM(S): at 23:54

## 2020-03-27 RX ADMIN — Medication 100 MICROGRAM(S): at 09:28

## 2020-03-27 RX ADMIN — SODIUM CHLORIDE 60 MILLILITER(S): 9 INJECTION, SOLUTION INTRAVENOUS at 13:34

## 2020-03-27 NOTE — DISCHARGE NOTE PROVIDER - NSDCCPCAREPLAN_GEN_ALL_CORE_FT
PRINCIPAL DISCHARGE DIAGNOSIS  Diagnosis: Pneumonia due to david coronavirus  Assessment and Plan of Treatment: Patient presented with generalized weakness and recurrent falls.    Admitted for concern for COVID. Chest x-ray showed clear lungs. CT head showed no acute intracranial hemorrhage. CT cervical spine showed no acute cervical spine fracture or evidence of traumatic malalignment. Nonspecific ground less airspace opacity within the left upper lobe. COVID resulted positive. Flu and RSV were negative. Blood and urine cultures were negative. Started on IV ceftriaxone and azithromycin antibiotics. Follow up with your PCP for further evaluation and management. Ensure to self isolate for 2 weeks to prevent spread of infection. Ensure adequate hydration. Clean the bathroom after use if shared with other people. Ensure to not share the bedroom with other people when sleeping.      SECONDARY DISCHARGE DIAGNOSES  Diagnosis: Essential hypertension  Assessment and Plan of Treatment: Recommended to have a low salt/DASH diet. Continue with blood pressure medications. Follow up with PCP for management of hypertension.    Diagnosis: Type 2 diabetes mellitus with other specified complication, unspecified whether long term insulin use  Assessment and Plan of Treatment: Recommended to have a low carbohydrate diet. Continue to take diabetic medications. Continue to monitor blood sugar. Follow up with PCP for management of diabetes.    Diagnosis: Acquired hypothyroidism  Assessment and Plan of Treatment: Continue your home dose of synthroid. Follow up with your PCP for further management of your hypothyroidism.    Diagnosis: Pneumonia due to human coronavirus  Assessment and Plan of Treatment: CORONAVIRUS INSTRUCTIONS:   Based on your current clinical status and stability, it has been determined that you no longer need hospitalization and can recover while remaining in self-quarantine at home. You should follow the prevention steps below until a healthcare provider or local or state health department says you can return to your normal activities.   1. You should restrict activities outside your home, except for getting medical care.   2. Do not go to work, school, or public areas.   3. Avoid using public transportation, ride-sharing, or taxis.   4. Separate yourself from other people and animals in your home as much as possible.  When you are around other people (e.g., sharing a room or vehicle) you should wear a facemask.  5. Wash your hands often with soap and water for at least 20 seconds, especially after blowing your nose, coughing, or sneezing; going to the bathroom; and before eating or preparing food.  6. Cover your mouth and nose with a tissue when you cough or sneeze. Throw used tissues in a lined trash can. Immediately wash your hands with soap and water for at least 20 seconds  7. High touch surfaces include counters, tabletops, doorknobs, bathroom fixtures, toilets, phones, keyboards, tablets, and bedside tables.  8. Avoid sharing dishes, drinking glasses, cups, eating utensils, towels, or bedding with other people or pets in your home. After using these items, they should be washed thoroughly with soap and water.  You are strongly advised to seek prompt medical attention if your illness worsens or you develop new symptoms like fever or difficulty breathing.    Diagnosis: Acute kidney injury  Assessment and Plan of Treatment: Noted to have elevated creatinine levels on admission. Treated with IV fluids. Creatinine returned to normal. Ensure adequate hydration to prevent any damage to kidneys and further rise in creatinine level. Follow up with your PCP for further management and have repeat BMP lab test done within 1 week.    Diagnosis: Dementia without behavioral disturbance, unspecified dementia type  Assessment and Plan of Treatment: Continue your home medications for dementia. Follow up with your PCP for further evaluation and management. PRINCIPAL DISCHARGE DIAGNOSIS  Diagnosis: Infection due to david coronavirus  Assessment and Plan of Treatment: Patient is an 86 year old female, with PMH of CAD s/p PCI, DM, dementia, and glaucoma, who presented with generalized weakness and recurrent falls.    Admitted for concern for COVID. Chest x-ray showed clear lungs. CT head showed no acute intracranial hemorrhage. CT cervical spine showed no acute cervical spine fracture or evidence of traumatic malalignment. Nonspecific ground less airspace opacity within the left upper lobe. COVID resulted positive. Flu and RSV were negative. Blood and urine cultures were negative. Started on IV ceftriaxone and azithromycin. Patient had poor PO intake and refusing PO meds and was treated with IV fluids. Patient was made DNR/DNI as per family request. Patient saturated at around 94-95% on room air. Patient was started on course of Plaquenil. Patient did not require any oxygen supplementation during admission. Patient to be discharged to long term care facility with palliative care. Follow up with your PCP for further management.      SECONDARY DISCHARGE DIAGNOSES  Diagnosis: Pneumonia due to human coronavirus  Assessment and Plan of Treatment: CORONAVIRUS INSTRUCTIONS:   Based on your current clinical status and stability, it has been determined that you no longer need hospitalization and can recover while remaining in self-quarantine at home. You should follow the prevention steps below until a healthcare provider or local or state health department says you can return to your normal activities.   1. You should restrict activities outside your home, except for getting medical care.   2. Do not go to work, school, or public areas.   3. Avoid using public transportation, ride-sharing, or taxis.   4. Separate yourself from other people and animals in your home as much as possible.  When you are around other people (e.g., sharing a room or vehicle) you should wear a facemask.  5. Wash your hands often with soap and water for at least 20 seconds, especially after blowing your nose, coughing, or sneezing; going to the bathroom; and before eating or preparing food.  6. Cover your mouth and nose with a tissue when you cough or sneeze. Throw used tissues in a lined trash can. Immediately wash your hands with soap and water for at least 20 seconds  7. High touch surfaces include counters, tabletops, doorknobs, bathroom fixtures, toilets, phones, keyboards, tablets, and bedside tables.  8. Avoid sharing dishes, drinking glasses, cups, eating utensils, towels, or bedding with other people or pets in your home. After using these items, they should be washed thoroughly with soap and water.  You are strongly advised to seek prompt medical attention if your illness worsens or you develop new symptoms like fever or difficulty breathing.    Diagnosis: Essential hypertension  Assessment and Plan of Treatment: Recommended to have a low salt/DASH diet. Continue with blood pressure medications. Follow up with PCP for management of hypertension.    Diagnosis: Dementia without behavioral disturbance, unspecified dementia type  Assessment and Plan of Treatment: Continue home medication of donepezil. Follow up with your PCP for further evaluation and management.    Diagnosis: Type 2 diabetes mellitus with other specified complication, unspecified whether long term insulin use  Assessment and Plan of Treatment: Recommended to have a low carbohydrate diet. Continue to take diabetic medications. Continue to monitor blood sugar. Follow up with PCP for management of diabetes.    Diagnosis: Acute kidney injury  Assessment and Plan of Treatment: Noted to have elevated creatinine on admission. Improved with IV fluids. Elevation in creatinine likely due to dehydration. Continue to ensure adequate hydration to prevent any further rise in creatinine level and damage to kidneys. Follow up with your PCP for further evaluation and management.    Diagnosis: Acquired hypothyroidism  Assessment and Plan of Treatment: Continue your home dose of synthroid. Follow up with your PCP for further management of your hypothyroidism.

## 2020-03-27 NOTE — PROGRESS NOTE ADULT - SUBJECTIVE AND OBJECTIVE BOX
PGY 1 Note discussed with supervising resident and primary attending    Patient is a 86y old  Female who presents with a chief complaint of Fall, Pneumonia (26 Mar 2020 13:22)    INTERVAL HPI/OVERNIGHT EVENTS: Patient seen and examined at the bedside. Patient is awake but not responsive to questioning. Patient is refusing PO meds and has poor oral intake. Patient is COVID positive. Saturating at 94% on room air.     MEDICATIONS  (STANDING):  ascorbic acid 500 milliGRAM(s) Oral two times a day  atorvastatin 20 milliGRAM(s) Oral at bedtime  azithromycin  IVPB 500 milliGRAM(s) IV Intermittent every 24 hours  cefTRIAXone   IVPB 1000 milliGRAM(s) IV Intermittent every 24 hours  cefTRIAXone   IVPB      clopidogrel Tablet 75 milliGRAM(s) Oral daily  dextrose 5% + sodium chloride 0.45%. 1000 milliLiter(s) (60 mL/Hr) IV Continuous <Continuous>  dextrose 5% + sodium chloride 0.45%. 1000 milliLiter(s) (60 mL/Hr) IV Continuous <Continuous>  donepezil 5 milliGRAM(s) Oral at bedtime  dorzolamide 2%/timolol 0.5% Ophthalmic Solution 1 Drop(s) Both EYES two times a day  heparin  Injectable 5000 Unit(s) SubCutaneous every 8 hours  insulin lispro (HumaLOG) corrective regimen sliding scale   SubCutaneous three times a day before meals  latanoprost 0.005% Ophthalmic Solution 1 Drop(s) Both EYES at bedtime  levothyroxine 100 MICROGram(s) Oral daily  losartan 50 milliGRAM(s) Oral daily  metoprolol tartrate Injectable 5 milliGRAM(s) IV Push every 6 hours  pantoprazole  Injectable 40 milliGRAM(s) IV Push daily    __________________________________________________  REVIEW OF SYSTEMS:  Unable to obtain due to patient being nonverbal    Vital Signs Last 24 Hrs  T(C): 37 (27 Mar 2020 09:07), Max: 37.9 (26 Mar 2020 23:38)  T(F): 98.6 (27 Mar 2020 09:07), Max: 100.3 (26 Mar 2020 23:38)  HR: 93 (27 Mar 2020 09:07) (70 - 106)  BP: 160/93 (27 Mar 2020 09:07) (159/100 - 176/80)  RR: 18 (27 Mar 2020 09:07) (18 - 18)  SpO2: 94% (27 Mar 2020 09:07) (94% - 99%)    ________________________________________________  PHYSICAL EXAM:  GENERAL: Patient seen resting in bed and in no apparent distress; non responsive to questions  HEENT: Normocephalic; conjunctivae and sclerae clear   NECK: supple  CHEST/LUNG: Crackles present to auscultation bilaterally  HEART: S1 S2, regular; no murmurs  ABDOMEN: Soft, Nontender, Nondistended; Bowel sounds present  EXTREMITIES: no edema; no calf tenderness  SKIN: warm and dry  NERVOUS SYSTEM: Awake and alert; not responsive to questions even with      _________________________________________________  LABS:                        11.0   4.79  )-----------( 228      ( 27 Mar 2020 07:51 )             33.4     03-27    137  |  107  |  14  ----------------------------<  217<H>  3.9   |  24  |  1.07    Ca    8.3<L>      27 Mar 2020 07:51  Phos  2.7     03-  Mg     1.7     03-27    TPro  6.5  /  Alb  2.6<L>  /  TBili  0.3  /  DBili  x   /  AST  34  /  ALT  20  /  AlkPhos  73  03-25    PT/INR - ( 25 Mar 2020 22:33 )   PT: 11.2 sec;   INR: 0.99 ratio         PTT - ( 25 Mar 2020 22:33 )  PTT:30.7 sec  Urinalysis Basic - ( 26 Mar 2020 05:40 )    Color: Yellow / Appearance: Slightly Turbid / S.015 / pH: x  Gluc: x / Ketone: Trace  / Bili: Negative / Urobili: Negative   Blood: x / Protein: 100 / Nitrite: Negative   Leuk Esterase: Negative / RBC: 0-2 /HPF / WBC 0-2 /HPF   Sq Epi: x / Non Sq Epi: Few /HPF / Bacteria: Moderate /HPF      CAPILLARY BLOOD GLUCOSE    POCT Blood Glucose.: 204 mg/dL (27 Mar 2020 08:59)  POCT Blood Glucose.: 137 mg/dL (26 Mar 2020 21:13)  POCT Blood Glucose.: 215 mg/dL (26 Mar 2020 16:49)  POCT Blood Glucose.: 173 mg/dL (26 Mar 2020 11:02)      RADIOLOGY & ADDITIONAL TESTS:    Imaging Personally Reviewed:  YES    No new imaging     Consultant(s) Notes Reviewed:   YES    Care Discussed with Consultants :     Plan of care was discussed with patient and /or primary care giver; all questions and concerns were addressed and care was aligned with patient's wishes. PGY 1 Note discussed with supervising resident and primary attending    Patient is a 86y old  Female who presents with a chief complaint of Fall, Pneumonia (26 Mar 2020 13:22)    INTERVAL HPI/OVERNIGHT EVENTS: Patient seen and examined at the bedside. Patient is awake but not responsive to questioning. Patient is refusing PO meds and has poor oral intake. Patient is COVID positive. Saturating at 94% on room air. Discussed GOC with grandsonBertrand ([phone]) and would like for patient to be DNR/DNI. MOLST form placed in chart    MEDICATIONS  (STANDING):  ascorbic acid 500 milliGRAM(s) Oral two times a day  atorvastatin 20 milliGRAM(s) Oral at bedtime  azithromycin  IVPB 500 milliGRAM(s) IV Intermittent every 24 hours  cefTRIAXone   IVPB 1000 milliGRAM(s) IV Intermittent every 24 hours  cefTRIAXone   IVPB      clopidogrel Tablet 75 milliGRAM(s) Oral daily  dextrose 5% + sodium chloride 0.45%. 1000 milliLiter(s) (60 mL/Hr) IV Continuous <Continuous>  dextrose 5% + sodium chloride 0.45%. 1000 milliLiter(s) (60 mL/Hr) IV Continuous <Continuous>  donepezil 5 milliGRAM(s) Oral at bedtime  dorzolamide 2%/timolol 0.5% Ophthalmic Solution 1 Drop(s) Both EYES two times a day  heparin  Injectable 5000 Unit(s) SubCutaneous every 8 hours  insulin lispro (HumaLOG) corrective regimen sliding scale   SubCutaneous three times a day before meals  latanoprost 0.005% Ophthalmic Solution 1 Drop(s) Both EYES at bedtime  levothyroxine 100 MICROGram(s) Oral daily  losartan 50 milliGRAM(s) Oral daily  metoprolol tartrate Injectable 5 milliGRAM(s) IV Push every 6 hours  pantoprazole  Injectable 40 milliGRAM(s) IV Push daily    __________________________________________________  REVIEW OF SYSTEMS:  Unable to obtain due to patient being nonverbal    Vital Signs Last 24 Hrs  T(C): 37 (27 Mar 2020 09:07), Max: 37.9 (26 Mar 2020 23:38)  T(F): 98.6 (27 Mar 2020 09:07), Max: 100.3 (26 Mar 2020 23:38)  HR: 93 (27 Mar 2020 09:07) (70 - 106)  BP: 160/93 (27 Mar 2020 09:07) (159/100 - 176/80)  RR: 18 (27 Mar 2020 09:07) (18 - 18)  SpO2: 94% (27 Mar 2020 09:07) (94% - 99%)    ________________________________________________  PHYSICAL EXAM:  GENERAL: Patient seen resting in bed and in no apparent distress; non responsive to questions  HEENT: Normocephalic; conjunctivae and sclerae clear   NECK: supple  CHEST/LUNG: Crackles present to auscultation bilaterally  HEART: S1 S2, regular; no murmurs  ABDOMEN: Soft, Nontender, Nondistended; Bowel sounds present  EXTREMITIES: no edema; no calf tenderness  SKIN: warm and dry  NERVOUS SYSTEM: Awake and alert; not responsive to questions even with      _________________________________________________  LABS:                        11.0   4.79  )-----------( 228      ( 27 Mar 2020 07:51 )             33.4     03-27    137  |  107  |  14  ----------------------------<  217<H>  3.9   |  24  |  1.07    Ca    8.3<L>      27 Mar 2020 07:51  Phos  2.7     03-27  Mg     1.7     03-27    TPro  6.5  /  Alb  2.6<L>  /  TBili  0.3  /  DBili  x   /  AST  34  /  ALT  20  /  AlkPhos  73  03-25    PT/INR - ( 25 Mar 2020 22:33 )   PT: 11.2 sec;   INR: 0.99 ratio         PTT - ( 25 Mar 2020 22:33 )  PTT:30.7 sec  Urinalysis Basic - ( 26 Mar 2020 05:40 )    Color: Yellow / Appearance: Slightly Turbid / S.015 / pH: x  Gluc: x / Ketone: Trace  / Bili: Negative / Urobili: Negative   Blood: x / Protein: 100 / Nitrite: Negative   Leuk Esterase: Negative / RBC: 0-2 /HPF / WBC 0-2 /HPF   Sq Epi: x / Non Sq Epi: Few /HPF / Bacteria: Moderate /HPF      CAPILLARY BLOOD GLUCOSE    POCT Blood Glucose.: 204 mg/dL (27 Mar 2020 08:59)  POCT Blood Glucose.: 137 mg/dL (26 Mar 2020 21:13)  POCT Blood Glucose.: 215 mg/dL (26 Mar 2020 16:49)  POCT Blood Glucose.: 173 mg/dL (26 Mar 2020 11:02)      RADIOLOGY & ADDITIONAL TESTS:    Imaging Personally Reviewed:  YES    No new imaging     Consultant(s) Notes Reviewed:   YES    Care Discussed with Consultants :     Plan of care was discussed with patient and /or primary care giver; all questions and concerns were addressed and care was aligned with patient's wishes.

## 2020-03-27 NOTE — DISCHARGE NOTE PROVIDER - NSDCMRMEDTOKEN_GEN_ALL_CORE_FT
Aspir 81 oral delayed release tablet:  orally   atorvastatin 20 mg oral tablet:  orally   calcium carbonate:  orally   donepezil 5 mg oral tablet: 1 tab(s) orally once a day (at bedtime)  FeroSul 325 mg (65 mg elemental iron) oral tablet:  orally   glipiZIDE 5 mg oral tablet: 1 tab(s) orally once a day  HumaLOG 100 units/mL subcutaneous solution:  subcutaneous   Januvia: 100 milligram(s) orally once a day  losartan 50 mg oral tablet:  orally   metFORMIN 500 mg oral tablet:  orally   Metoprolol Succinate ER 50 mg oral tablet, extended release: 1 tab(s) orally once a day  omeprazole 40 mg oral delayed release capsule: 1 cap(s) orally once a day  Plavix 75 mg oral tablet:  orally   Synthroid 100 mcg (0.1 mg) oral tablet:  orally   Travatan Z:  to each affected eye

## 2020-03-28 LAB
ANION GAP SERPL CALC-SCNC: 8 MMOL/L — SIGNIFICANT CHANGE UP (ref 5–17)
BASOPHILS # BLD AUTO: 0.01 K/UL — SIGNIFICANT CHANGE UP (ref 0–0.2)
BASOPHILS NFR BLD AUTO: 0.2 % — SIGNIFICANT CHANGE UP (ref 0–2)
BUN SERPL-MCNC: 9 MG/DL — SIGNIFICANT CHANGE UP (ref 7–18)
CALCIUM SERPL-MCNC: 8.2 MG/DL — LOW (ref 8.4–10.5)
CHLORIDE SERPL-SCNC: 107 MMOL/L — SIGNIFICANT CHANGE UP (ref 96–108)
CO2 SERPL-SCNC: 22 MMOL/L — SIGNIFICANT CHANGE UP (ref 22–31)
CREAT SERPL-MCNC: 1 MG/DL — SIGNIFICANT CHANGE UP (ref 0.5–1.3)
EOSINOPHIL # BLD AUTO: 0.13 K/UL — SIGNIFICANT CHANGE UP (ref 0–0.5)
EOSINOPHIL NFR BLD AUTO: 2.1 % — SIGNIFICANT CHANGE UP (ref 0–6)
GLUCOSE BLDC GLUCOMTR-MCNC: 185 MG/DL — HIGH (ref 70–99)
GLUCOSE BLDC GLUCOMTR-MCNC: 188 MG/DL — HIGH (ref 70–99)
GLUCOSE BLDC GLUCOMTR-MCNC: 204 MG/DL — HIGH (ref 70–99)
GLUCOSE BLDC GLUCOMTR-MCNC: 272 MG/DL — HIGH (ref 70–99)
GLUCOSE SERPL-MCNC: 178 MG/DL — HIGH (ref 70–99)
HCT VFR BLD CALC: 33.6 % — LOW (ref 34.5–45)
HGB BLD-MCNC: 11 G/DL — LOW (ref 11.5–15.5)
IMM GRANULOCYTES NFR BLD AUTO: 0.5 % — SIGNIFICANT CHANGE UP (ref 0–1.5)
LYMPHOCYTES # BLD AUTO: 2.15 K/UL — SIGNIFICANT CHANGE UP (ref 1–3.3)
LYMPHOCYTES # BLD AUTO: 35.1 % — SIGNIFICANT CHANGE UP (ref 13–44)
MAGNESIUM SERPL-MCNC: 1.7 MG/DL — SIGNIFICANT CHANGE UP (ref 1.6–2.6)
MCHC RBC-ENTMCNC: 28.7 PG — SIGNIFICANT CHANGE UP (ref 27–34)
MCHC RBC-ENTMCNC: 32.7 GM/DL — SIGNIFICANT CHANGE UP (ref 32–36)
MCV RBC AUTO: 87.7 FL — SIGNIFICANT CHANGE UP (ref 80–100)
MONOCYTES # BLD AUTO: 0.61 K/UL — SIGNIFICANT CHANGE UP (ref 0–0.9)
MONOCYTES NFR BLD AUTO: 10 % — SIGNIFICANT CHANGE UP (ref 2–14)
NEUTROPHILS # BLD AUTO: 3.2 K/UL — SIGNIFICANT CHANGE UP (ref 1.8–7.4)
NEUTROPHILS NFR BLD AUTO: 52.1 % — SIGNIFICANT CHANGE UP (ref 43–77)
NRBC # BLD: 0 /100 WBCS — SIGNIFICANT CHANGE UP (ref 0–0)
PHOSPHATE SERPL-MCNC: 2.6 MG/DL — SIGNIFICANT CHANGE UP (ref 2.5–4.5)
PLATELET # BLD AUTO: 266 K/UL — SIGNIFICANT CHANGE UP (ref 150–400)
POTASSIUM SERPL-MCNC: 3.8 MMOL/L — SIGNIFICANT CHANGE UP (ref 3.5–5.3)
POTASSIUM SERPL-SCNC: 3.8 MMOL/L — SIGNIFICANT CHANGE UP (ref 3.5–5.3)
RBC # BLD: 3.83 M/UL — SIGNIFICANT CHANGE UP (ref 3.8–5.2)
RBC # FLD: 12.8 % — SIGNIFICANT CHANGE UP (ref 10.3–14.5)
SODIUM SERPL-SCNC: 137 MMOL/L — SIGNIFICANT CHANGE UP (ref 135–145)
WBC # BLD: 6.13 K/UL — SIGNIFICANT CHANGE UP (ref 3.8–10.5)
WBC # FLD AUTO: 6.13 K/UL — SIGNIFICANT CHANGE UP (ref 3.8–10.5)

## 2020-03-28 RX ORDER — HYDRALAZINE HCL 50 MG
5 TABLET ORAL ONCE
Refills: 0 | Status: COMPLETED | OUTPATIENT
Start: 2020-03-28 | End: 2020-03-28

## 2020-03-28 RX ADMIN — DONEPEZIL HYDROCHLORIDE 5 MILLIGRAM(S): 10 TABLET, FILM COATED ORAL at 23:02

## 2020-03-28 RX ADMIN — PANTOPRAZOLE SODIUM 40 MILLIGRAM(S): 20 TABLET, DELAYED RELEASE ORAL at 12:20

## 2020-03-28 RX ADMIN — LATANOPROST 1 DROP(S): 0.05 SOLUTION/ DROPS OPHTHALMIC; TOPICAL at 23:04

## 2020-03-28 RX ADMIN — Medication 5 MILLIGRAM(S): at 23:03

## 2020-03-28 RX ADMIN — Medication 5 MILLIGRAM(S): at 17:00

## 2020-03-28 RX ADMIN — Medication 5 MILLIGRAM(S): at 12:20

## 2020-03-28 RX ADMIN — Medication 1: at 08:43

## 2020-03-28 RX ADMIN — AZITHROMYCIN 255 MILLIGRAM(S): 500 TABLET, FILM COATED ORAL at 14:46

## 2020-03-28 RX ADMIN — HEPARIN SODIUM 5000 UNIT(S): 5000 INJECTION INTRAVENOUS; SUBCUTANEOUS at 05:07

## 2020-03-28 RX ADMIN — ATORVASTATIN CALCIUM 20 MILLIGRAM(S): 80 TABLET, FILM COATED ORAL at 23:02

## 2020-03-28 RX ADMIN — Medication 5 MILLIGRAM(S): at 05:07

## 2020-03-28 RX ADMIN — Medication 5 MILLIGRAM(S): at 08:43

## 2020-03-28 RX ADMIN — Medication 3: at 16:39

## 2020-03-28 RX ADMIN — CEFTRIAXONE 100 MILLIGRAM(S): 500 INJECTION, POWDER, FOR SOLUTION INTRAMUSCULAR; INTRAVENOUS at 06:30

## 2020-03-28 RX ADMIN — HEPARIN SODIUM 5000 UNIT(S): 5000 INJECTION INTRAVENOUS; SUBCUTANEOUS at 23:04

## 2020-03-28 RX ADMIN — HEPARIN SODIUM 5000 UNIT(S): 5000 INJECTION INTRAVENOUS; SUBCUTANEOUS at 14:46

## 2020-03-28 RX ADMIN — Medication 2: at 12:20

## 2020-03-28 NOTE — PROGRESS NOTE ADULT - SUBJECTIVE AND OBJECTIVE BOX
Patient is a 86y old  Female who presents with a chief complaint of Fall, Pneumonia (26 Mar 2020 13:22)    INTERVAL HPI/OVERNIGHT EVENTS: Patient seen and examined at the bedside. Patient is awake but not responsive to questioning. Patient is refusing PO meds and has poor oral intake. Patient is COVID positive. Saturating at 94% on room air. Discussed GOC with grandsonBertrand ([phone]) and would like for patient to be DNR/DNI. MOLST form placed in chart    MEDICATIONS  (STANDING):  ascorbic acid 500 milliGRAM(s) Oral two times a day  atorvastatin 20 milliGRAM(s) Oral at bedtime  azithromycin  IVPB 500 milliGRAM(s) IV Intermittent every 24 hours  cefTRIAXone   IVPB 1000 milliGRAM(s) IV Intermittent every 24 hours  cefTRIAXone   IVPB      clopidogrel Tablet 75 milliGRAM(s) Oral daily  dextrose 5% + sodium chloride 0.45%. 1000 milliLiter(s) (60 mL/Hr) IV Continuous <Continuous>  dextrose 5% + sodium chloride 0.45%. 1000 milliLiter(s) (60 mL/Hr) IV Continuous <Continuous>  donepezil 5 milliGRAM(s) Oral at bedtime  dorzolamide 2%/timolol 0.5% Ophthalmic Solution 1 Drop(s) Both EYES two times a day  heparin  Injectable 5000 Unit(s) SubCutaneous every 8 hours  insulin lispro (HumaLOG) corrective regimen sliding scale   SubCutaneous three times a day before meals  latanoprost 0.005% Ophthalmic Solution 1 Drop(s) Both EYES at bedtime  levothyroxine 100 MICROGram(s) Oral daily  losartan 50 milliGRAM(s) Oral daily  metoprolol tartrate Injectable 5 milliGRAM(s) IV Push every 6 hours  pantoprazole  Injectable 40 milliGRAM(s) IV Push daily    MEDICATIONS  (PRN):    Vital Signs Last 24 Hrs  T(C): 36.3 (28 Mar 2020 20:25), Max: 37.4 (27 Mar 2020 23:30)  T(F): 97.4 (28 Mar 2020 20:25), Max: 99.3 (27 Mar 2020 23:30)  HR: 113 (28 Mar 2020 20:25) (100 - 120)  BP: 151/98 (28 Mar 2020 20:25) (144/79 - 193/81)  BP(mean): --  RR: 18 (28 Mar 2020 20:25) (18 - 20)  SpO2: 95% (28 Mar 2020 20:25) (95% - 100%)    ________________________________________________  PHYSICAL EXAM:  GENERAL: Patient seen resting in bed and in no apparent distress; non responsive to questions  HEENT: Normocephalic; conjunctivae and sclerae clear   NECK: supple  CHEST/LUNG: Crackles present to auscultation bilaterally  HEART: S1 S2, regular; no murmurs  ABDOMEN: Soft, Nontender, Nondistended; Bowel sounds present  EXTREMITIES: no edema; no calf tenderness  SKIN: warm and dry  NERVOUS SYSTEM: Awake and alert; not responsive to questions even with      LABS:      137  |  107  |  9   ----------------------------<  178<H>  3.8   |  22  |  1.00    Ca    8.2<L>      28 Mar 2020 06:11  Phos  2.6       Mg     1.7           Creatinine Trend: 1.00 <--, 1.07 <--, 1.23 <--, 1.51 <--                        11.0   6.13  )-----------( 266      ( 28 Mar 2020 06:11 )             33.6     Urine Studies:  Urinalysis Basic - ( 26 Mar 2020 05:40 )    Color: Yellow / Appearance: Slightly Turbid / S.015 / pH:   Gluc:  / Ketone: Trace  / Bili: Negative / Urobili: Negative   Blood:  / Protein: 100 / Nitrite: Negative   Leuk Esterase: Negative / RBC: 0-2 /HPF / WBC 0-2 /HPF   Sq Epi:  / Non Sq Epi: Few /HPF / Bacteria: Moderate /HPF                  : x  Gluc: x / Ketone: Trace  / Bili: Negative / Urobili: Negative   Blood: x / Protein: 100 / Nitrite: Negative   Leuk Esterase: Negative / RBC: 0-2 /HPF / WBC 0-2 /HPF   Sq Epi: x / Non Sq Epi: Few /HPF / Bacteria: Moderate /HPF      CAPILLARY BLOOD GLUCOSE    POCT Blood Glucose.: 204 mg/dL (27 Mar 2020 08:59)  POCT Blood Glucose.: 137 mg/dL (26 Mar 2020 21:13)  POCT Blood Glucose.: 215 mg/dL (26 Mar 2020 16:49)  POCT Blood Glucose.: 173 mg/dL (26 Mar 2020 11:02)      RADIOLOGY & ADDITIONAL TESTS:    Imaging Personally Reviewed:  YES    No new imaging     Consultant(s) Notes Reviewed:   YES    Care Discussed with Consultants :     Plan of care was discussed with patient and /or primary care giver; all questions and concerns were addressed and care was aligned with patient's wishes.

## 2020-03-29 LAB
ANION GAP SERPL CALC-SCNC: 10 MMOL/L — SIGNIFICANT CHANGE UP (ref 5–17)
BASOPHILS # BLD AUTO: 0.01 K/UL — SIGNIFICANT CHANGE UP (ref 0–0.2)
BASOPHILS NFR BLD AUTO: 0.1 % — SIGNIFICANT CHANGE UP (ref 0–2)
BUN SERPL-MCNC: 11 MG/DL — SIGNIFICANT CHANGE UP (ref 7–18)
CALCIUM SERPL-MCNC: 8.8 MG/DL — SIGNIFICANT CHANGE UP (ref 8.4–10.5)
CHLORIDE SERPL-SCNC: 105 MMOL/L — SIGNIFICANT CHANGE UP (ref 96–108)
CO2 SERPL-SCNC: 20 MMOL/L — LOW (ref 22–31)
CREAT SERPL-MCNC: 1.01 MG/DL — SIGNIFICANT CHANGE UP (ref 0.5–1.3)
EOSINOPHIL # BLD AUTO: 0 K/UL — SIGNIFICANT CHANGE UP (ref 0–0.5)
EOSINOPHIL NFR BLD AUTO: 0 % — SIGNIFICANT CHANGE UP (ref 0–6)
GLUCOSE BLDC GLUCOMTR-MCNC: 175 MG/DL — HIGH (ref 70–99)
GLUCOSE BLDC GLUCOMTR-MCNC: 195 MG/DL — HIGH (ref 70–99)
GLUCOSE BLDC GLUCOMTR-MCNC: 206 MG/DL — HIGH (ref 70–99)
GLUCOSE BLDC GLUCOMTR-MCNC: 208 MG/DL — HIGH (ref 70–99)
GLUCOSE SERPL-MCNC: 197 MG/DL — HIGH (ref 70–99)
HCT VFR BLD CALC: 35.9 % — SIGNIFICANT CHANGE UP (ref 34.5–45)
HGB BLD-MCNC: 11.7 G/DL — SIGNIFICANT CHANGE UP (ref 11.5–15.5)
IMM GRANULOCYTES NFR BLD AUTO: 0.6 % — SIGNIFICANT CHANGE UP (ref 0–1.5)
LYMPHOCYTES # BLD AUTO: 1.36 K/UL — SIGNIFICANT CHANGE UP (ref 1–3.3)
LYMPHOCYTES # BLD AUTO: 18.9 % — SIGNIFICANT CHANGE UP (ref 13–44)
MAGNESIUM SERPL-MCNC: 1.8 MG/DL — SIGNIFICANT CHANGE UP (ref 1.6–2.6)
MCHC RBC-ENTMCNC: 28.7 PG — SIGNIFICANT CHANGE UP (ref 27–34)
MCHC RBC-ENTMCNC: 32.6 GM/DL — SIGNIFICANT CHANGE UP (ref 32–36)
MCV RBC AUTO: 88.2 FL — SIGNIFICANT CHANGE UP (ref 80–100)
MONOCYTES # BLD AUTO: 0.79 K/UL — SIGNIFICANT CHANGE UP (ref 0–0.9)
MONOCYTES NFR BLD AUTO: 11 % — SIGNIFICANT CHANGE UP (ref 2–14)
NEUTROPHILS # BLD AUTO: 4.99 K/UL — SIGNIFICANT CHANGE UP (ref 1.8–7.4)
NEUTROPHILS NFR BLD AUTO: 69.4 % — SIGNIFICANT CHANGE UP (ref 43–77)
NRBC # BLD: 0 /100 WBCS — SIGNIFICANT CHANGE UP (ref 0–0)
PHOSPHATE SERPL-MCNC: 2.8 MG/DL — SIGNIFICANT CHANGE UP (ref 2.5–4.5)
PLATELET # BLD AUTO: 290 K/UL — SIGNIFICANT CHANGE UP (ref 150–400)
POTASSIUM SERPL-MCNC: 3.6 MMOL/L — SIGNIFICANT CHANGE UP (ref 3.5–5.3)
POTASSIUM SERPL-SCNC: 3.6 MMOL/L — SIGNIFICANT CHANGE UP (ref 3.5–5.3)
RBC # BLD: 4.07 M/UL — SIGNIFICANT CHANGE UP (ref 3.8–5.2)
RBC # FLD: 12.8 % — SIGNIFICANT CHANGE UP (ref 10.3–14.5)
SODIUM SERPL-SCNC: 135 MMOL/L — SIGNIFICANT CHANGE UP (ref 135–145)
T4 SERPL-MCNC: 9.5 UG/DL — SIGNIFICANT CHANGE UP
WBC # BLD: 7.19 K/UL — SIGNIFICANT CHANGE UP (ref 3.8–10.5)
WBC # FLD AUTO: 7.19 K/UL — SIGNIFICANT CHANGE UP (ref 3.8–10.5)

## 2020-03-29 RX ORDER — HYDROXYCHLOROQUINE SULFATE 200 MG
TABLET ORAL
Refills: 0 | Status: COMPLETED | OUTPATIENT
Start: 2020-03-29 | End: 2020-04-03

## 2020-03-29 RX ORDER — HYDROXYCHLOROQUINE SULFATE 200 MG
200 TABLET ORAL EVERY 12 HOURS
Refills: 0 | Status: COMPLETED | OUTPATIENT
Start: 2020-03-30 | End: 2020-04-03

## 2020-03-29 RX ORDER — HYDROXYCHLOROQUINE SULFATE 200 MG
400 TABLET ORAL EVERY 12 HOURS
Refills: 0 | Status: COMPLETED | OUTPATIENT
Start: 2020-03-29 | End: 2020-03-29

## 2020-03-29 RX ADMIN — CEFTRIAXONE 100 MILLIGRAM(S): 500 INJECTION, POWDER, FOR SOLUTION INTRAMUSCULAR; INTRAVENOUS at 09:34

## 2020-03-29 RX ADMIN — Medication 100 MICROGRAM(S): at 05:59

## 2020-03-29 RX ADMIN — Medication 5 MILLIGRAM(S): at 17:21

## 2020-03-29 RX ADMIN — DONEPEZIL HYDROCHLORIDE 5 MILLIGRAM(S): 10 TABLET, FILM COATED ORAL at 22:03

## 2020-03-29 RX ADMIN — AZITHROMYCIN 255 MILLIGRAM(S): 500 TABLET, FILM COATED ORAL at 12:22

## 2020-03-29 RX ADMIN — ATORVASTATIN CALCIUM 20 MILLIGRAM(S): 80 TABLET, FILM COATED ORAL at 22:03

## 2020-03-29 RX ADMIN — LATANOPROST 1 DROP(S): 0.05 SOLUTION/ DROPS OPHTHALMIC; TOPICAL at 21:34

## 2020-03-29 RX ADMIN — LOSARTAN POTASSIUM 50 MILLIGRAM(S): 100 TABLET, FILM COATED ORAL at 05:58

## 2020-03-29 RX ADMIN — DORZOLAMIDE HYDROCHLORIDE TIMOLOL MALEATE 1 DROP(S): 20; 5 SOLUTION/ DROPS OPHTHALMIC at 05:59

## 2020-03-29 RX ADMIN — Medication 400 MILLIGRAM(S): at 22:03

## 2020-03-29 RX ADMIN — Medication 500 MILLIGRAM(S): at 05:59

## 2020-03-29 RX ADMIN — HEPARIN SODIUM 5000 UNIT(S): 5000 INJECTION INTRAVENOUS; SUBCUTANEOUS at 13:30

## 2020-03-29 RX ADMIN — Medication 2: at 09:18

## 2020-03-29 RX ADMIN — Medication 5 MILLIGRAM(S): at 12:23

## 2020-03-29 RX ADMIN — PANTOPRAZOLE SODIUM 40 MILLIGRAM(S): 20 TABLET, DELAYED RELEASE ORAL at 12:23

## 2020-03-29 RX ADMIN — DORZOLAMIDE HYDROCHLORIDE TIMOLOL MALEATE 1 DROP(S): 20; 5 SOLUTION/ DROPS OPHTHALMIC at 17:21

## 2020-03-29 RX ADMIN — HEPARIN SODIUM 5000 UNIT(S): 5000 INJECTION INTRAVENOUS; SUBCUTANEOUS at 21:34

## 2020-03-29 RX ADMIN — HEPARIN SODIUM 5000 UNIT(S): 5000 INJECTION INTRAVENOUS; SUBCUTANEOUS at 05:59

## 2020-03-29 RX ADMIN — Medication 5 MILLIGRAM(S): at 05:58

## 2020-03-29 NOTE — ACUTE INTERFACILITY TRANSFER NOTE - PLAN OF CARE
Continuity of care Patient is COVID positive. Continue with medications as prescribed  Maintain adequate hydration, nutrition, rest & activity as tolerated.  Cover your cough when and sneeze  Wash your hands for at least 20 seconds.  Patient saturating at 90% on room air.

## 2020-03-29 NOTE — PROGRESS NOTE ADULT - SUBJECTIVE AND OBJECTIVE BOX
SUBJECTIVE / OVERNIGHT EVENTS: pt seen and examined    MEDICATIONS  (STANDING):  ascorbic acid 500 milliGRAM(s) Oral two times a day  atorvastatin 20 milliGRAM(s) Oral at bedtime  cefTRIAXone   IVPB 1000 milliGRAM(s) IV Intermittent every 24 hours  cefTRIAXone   IVPB      clopidogrel Tablet 75 milliGRAM(s) Oral daily  dextrose 5% + sodium chloride 0.45%. 1000 milliLiter(s) (60 mL/Hr) IV Continuous <Continuous>  dextrose 5% + sodium chloride 0.45%. 1000 milliLiter(s) (60 mL/Hr) IV Continuous <Continuous>  donepezil 5 milliGRAM(s) Oral at bedtime  dorzolamide 2%/timolol 0.5% Ophthalmic Solution 1 Drop(s) Both EYES two times a day  heparin  Injectable 5000 Unit(s) SubCutaneous every 8 hours  insulin lispro (HumaLOG) corrective regimen sliding scale   SubCutaneous three times a day before meals  latanoprost 0.005% Ophthalmic Solution 1 Drop(s) Both EYES at bedtime  levothyroxine 100 MICROGram(s) Oral daily  losartan 50 milliGRAM(s) Oral daily  metoprolol tartrate Injectable 5 milliGRAM(s) IV Push every 6 hours  pantoprazole  Injectable 40 milliGRAM(s) IV Push daily    MEDICATIONS  (PRN):    Vital Signs Last 24 Hrs  T(C): 36.7 (29 Mar 2020 11:08), Max: 37.8 (28 Mar 2020 23:40)  T(F): 98 (29 Mar 2020 11:08), Max: 100.1 (28 Mar 2020 23:40)  HR: 101 (29 Mar 2020 11:08) (82 - 102)  BP: 112/111 (29 Mar 2020 11:08) (112/111 - 173/75)  BP(mean): --  RR: 16 (29 Mar 2020 11:08) (16 - 20)  SpO2: 92% (29 Mar 2020 11:08) (90% - 96%)    Constitutional: No fever, fatigue  Skin: No rash.  Eyes: No recent vision problems or eye pain.  ENT: No congestion, ear pain, or sore throat.  Cardiovascular: No chest pain or palpation.  Respiratory: No cough, shortness of breath, congestion, or wheezing.  Gastrointestinal: No abdominal pain, nausea, vomiting, or diarrhea.  Genitourinary: No dysuria.  Musculoskeletal: No joint swelling.  Neurologic: No headache.    PHYSICAL EXAM:  GENERAL: NAD  EYES: EOMI, PERRLA  NECK: Supple, No JVD  CHEST/LUNG: dec breath sounds at bases   HEART:  S1 , S2 +  ABDOMEN: soft , bs+  EXTREMITIES:  no edema  NEUROLOGY:more alert awake      LABS:                        11.7   7.19  )-----------( 290      ( 29 Mar 2020 08:12 )             35.9     03-29    135  |  105  |  11  ----------------------------<  197<H>  3.6   |  20<L>  |  1.01    Ca    8.8      29 Mar 2020 08:12  Phos  2.8     03-29  Mg     1.8     03-29                RADIOLOGY & ADDITIONAL TESTS:    Imaging Personally Reviewed:    Consultant(s) Notes Reviewed:      Care Discussed with Consultants/Other Providers:

## 2020-03-29 NOTE — ACUTE INTERFACILITY TRANSFER NOTE - HOSPITAL COURSE
Patient is an 86 year old female, with PMH of CAD s/p PCI, DM, dementia, and glaucoma, who presented with generalized weakness and recurrent falls.      Admitted for concern for COVID. Chest x-ray showed clear lungs. CT head showed no acute intracranial hemorrhage. CT cervical spine showed no acute cervical spine fracture or evidence of traumatic malalignment. Nonspecific ground less airspace opacity within the left upper lobe. COVID resulted positive. Flu and RSV were negative. Blood and urine cultures were negative. Started on IV ceftriaxone and azithromycin. Patient had poor PO intake and refusing PO meds and was treated with IV fluids. Patient was made DNR/DNI as per family request. Patient currently saturating at 90% on room air.

## 2020-03-30 LAB
GLUCOSE BLDC GLUCOMTR-MCNC: 221 MG/DL — HIGH (ref 70–99)
GLUCOSE BLDC GLUCOMTR-MCNC: 225 MG/DL — HIGH (ref 70–99)
GLUCOSE BLDC GLUCOMTR-MCNC: 225 MG/DL — HIGH (ref 70–99)
GLUCOSE BLDC GLUCOMTR-MCNC: 258 MG/DL — HIGH (ref 70–99)

## 2020-03-30 RX ADMIN — Medication 5 MILLIGRAM(S): at 06:51

## 2020-03-30 RX ADMIN — Medication 5 MILLIGRAM(S): at 16:46

## 2020-03-30 RX ADMIN — HEPARIN SODIUM 5000 UNIT(S): 5000 INJECTION INTRAVENOUS; SUBCUTANEOUS at 13:07

## 2020-03-30 RX ADMIN — LATANOPROST 1 DROP(S): 0.05 SOLUTION/ DROPS OPHTHALMIC; TOPICAL at 21:03

## 2020-03-30 RX ADMIN — DORZOLAMIDE HYDROCHLORIDE TIMOLOL MALEATE 1 DROP(S): 20; 5 SOLUTION/ DROPS OPHTHALMIC at 06:50

## 2020-03-30 RX ADMIN — PANTOPRAZOLE SODIUM 40 MILLIGRAM(S): 20 TABLET, DELAYED RELEASE ORAL at 12:34

## 2020-03-30 RX ADMIN — DORZOLAMIDE HYDROCHLORIDE TIMOLOL MALEATE 1 DROP(S): 20; 5 SOLUTION/ DROPS OPHTHALMIC at 16:47

## 2020-03-30 RX ADMIN — Medication 5 MILLIGRAM(S): at 00:08

## 2020-03-30 RX ADMIN — HEPARIN SODIUM 5000 UNIT(S): 5000 INJECTION INTRAVENOUS; SUBCUTANEOUS at 06:50

## 2020-03-30 RX ADMIN — Medication 5 MILLIGRAM(S): at 12:34

## 2020-03-30 NOTE — PROGRESS NOTE ADULT - SUBJECTIVE AND OBJECTIVE BOX
PGY 1 Note discussed with supervising resident and primary attending    Patient is a 86y old  Female who presents with a chief complaint of Fall, Pneumonia (29 Mar 2020 12:39)    INTERVAL HPI/OVERNIGHT EVENTS: Patient seen and examined at the bedside. Patient currently saturating at 96% on room air. Patient continues to refuse to eat and spitting out medications. Patient more awake and alert but is nonresponsive with  as well.     MEDICATIONS  (STANDING):  ascorbic acid 500 milliGRAM(s) Oral two times a day  atorvastatin 20 milliGRAM(s) Oral at bedtime  clopidogrel Tablet 75 milliGRAM(s) Oral daily  donepezil 5 milliGRAM(s) Oral at bedtime  dorzolamide 2%/timolol 0.5% Ophthalmic Solution 1 Drop(s) Both EYES two times a day  heparin  Injectable 5000 Unit(s) SubCutaneous every 8 hours  hydroxychloroquine 200 milliGRAM(s) Oral every 12 hours  hydroxychloroquine   Oral   insulin lispro (HumaLOG) corrective regimen sliding scale   SubCutaneous three times a day before meals  latanoprost 0.005% Ophthalmic Solution 1 Drop(s) Both EYES at bedtime  levothyroxine 100 MICROGram(s) Oral daily  losartan 50 milliGRAM(s) Oral daily  metoprolol tartrate Injectable 5 milliGRAM(s) IV Push every 6 hours  pantoprazole  Injectable 40 milliGRAM(s) IV Push daily      __________________________________________________  REVIEW OF SYSTEMS:  Unable to obtain due to nonverbal status    Vital Signs Last 24 Hrs  T(C): 37.2 (30 Mar 2020 08:25), Max: 37.2 (30 Mar 2020 08:25)  T(F): 98.9 (30 Mar 2020 08:25), Max: 98.9 (30 Mar 2020 08:25)  HR: 102 (30 Mar 2020 08:25) (99 - 110)  BP: 119/87 (30 Mar 2020 08:25) (119/87 - 184/77)  RR: 20 (30 Mar 2020 08:25) (20 - 20)  SpO2: 96% (30 Mar 2020 08:25) (95% - 96%)    ________________________________________________  PHYSICAL EXAM:  GENERAL: Patient seen resting in bed and in no apparent distress  HEENT: Normocephalic; conjunctivae and sclerae clear  NECK: supple  CHEST/LUNG: Mild crackles present to auscultation   HEART: S1 S2, regular; no murmurs  ABDOMEN: Soft, Nontender, Nondistended; Bowel sounds present  EXTREMITIES: no edema; no calf tenderness  SKIN: warm and dry  NERVOUS SYSTEM: Awake and alert but not responsive to questions; no new deficits    _________________________________________________  LABS:                        11.7   7.19  )-----------( 290      ( 29 Mar 2020 08:12 )             35.9     03-29    135  |  105  |  11  ----------------------------<  197<H>  3.6   |  20<L>  |  1.01    Ca    8.8      29 Mar 2020 08:12  Phos  2.8     03-29  Mg     1.8     03-29      CAPILLARY BLOOD GLUCOSE    POCT Blood Glucose.: 221 mg/dL (30 Mar 2020 08:27)  POCT Blood Glucose.: 175 mg/dL (29 Mar 2020 21:20)  POCT Blood Glucose.: 195 mg/dL (29 Mar 2020 16:12)      RADIOLOGY & ADDITIONAL TESTS:    Imaging Personally Reviewed:  YES    No new imaging     Consultant(s) Notes Reviewed:   YES    Care Discussed with Consultants :     Plan of care was discussed with patient and /or primary care giver; all questions and concerns were addressed and care was aligned with patient's wishes.

## 2020-03-31 LAB
ANION GAP SERPL CALC-SCNC: 11 MMOL/L — SIGNIFICANT CHANGE UP (ref 5–17)
ANISOCYTOSIS BLD QL: SLIGHT — SIGNIFICANT CHANGE UP
BASOPHILS # BLD AUTO: 0 K/UL — SIGNIFICANT CHANGE UP (ref 0–0.2)
BASOPHILS NFR BLD AUTO: 0 % — SIGNIFICANT CHANGE UP (ref 0–2)
BUN SERPL-MCNC: 18 MG/DL — SIGNIFICANT CHANGE UP (ref 7–18)
CALCIUM SERPL-MCNC: 8.8 MG/DL — SIGNIFICANT CHANGE UP (ref 8.4–10.5)
CHLORIDE SERPL-SCNC: 109 MMOL/L — HIGH (ref 96–108)
CO2 SERPL-SCNC: 19 MMOL/L — LOW (ref 22–31)
CREAT SERPL-MCNC: 1.01 MG/DL — SIGNIFICANT CHANGE UP (ref 0.5–1.3)
EOSINOPHIL # BLD AUTO: 0 K/UL — SIGNIFICANT CHANGE UP (ref 0–0.5)
EOSINOPHIL NFR BLD AUTO: 0 % — SIGNIFICANT CHANGE UP (ref 0–6)
GLUCOSE BLDC GLUCOMTR-MCNC: 151 MG/DL — HIGH (ref 70–99)
GLUCOSE BLDC GLUCOMTR-MCNC: 218 MG/DL — HIGH (ref 70–99)
GLUCOSE BLDC GLUCOMTR-MCNC: 233 MG/DL — HIGH (ref 70–99)
GLUCOSE BLDC GLUCOMTR-MCNC: 261 MG/DL — HIGH (ref 70–99)
GLUCOSE SERPL-MCNC: 244 MG/DL — HIGH (ref 70–99)
HCT VFR BLD CALC: 32.2 % — LOW (ref 34.5–45)
HGB BLD-MCNC: 10.9 G/DL — LOW (ref 11.5–15.5)
HYPOCHROMIA BLD QL: SLIGHT — SIGNIFICANT CHANGE UP
LYMPHOCYTES # BLD AUTO: 1.12 K/UL — SIGNIFICANT CHANGE UP (ref 1–3.3)
LYMPHOCYTES # BLD AUTO: 14 % — SIGNIFICANT CHANGE UP (ref 13–44)
MACROCYTES BLD QL: SLIGHT — SIGNIFICANT CHANGE UP
MAGNESIUM SERPL-MCNC: 2.3 MG/DL — SIGNIFICANT CHANGE UP (ref 1.6–2.6)
MANUAL SMEAR VERIFICATION: SIGNIFICANT CHANGE UP
MCHC RBC-ENTMCNC: 29 PG — SIGNIFICANT CHANGE UP (ref 27–34)
MCHC RBC-ENTMCNC: 33.9 GM/DL — SIGNIFICANT CHANGE UP (ref 32–36)
MCV RBC AUTO: 85.6 FL — SIGNIFICANT CHANGE UP (ref 80–100)
METAMYELOCYTES # FLD: 1 % — HIGH (ref 0–0)
MONOCYTES # BLD AUTO: 0.48 K/UL — SIGNIFICANT CHANGE UP (ref 0–0.9)
MONOCYTES NFR BLD AUTO: 6 % — SIGNIFICANT CHANGE UP (ref 2–14)
MYELOCYTES NFR BLD: 1 % — HIGH (ref 0–0)
NEUTROPHILS # BLD AUTO: 6.1 K/UL — SIGNIFICANT CHANGE UP (ref 1.8–7.4)
NEUTROPHILS NFR BLD AUTO: 71 % — SIGNIFICANT CHANGE UP (ref 43–77)
NEUTS BAND # BLD: 5 % — SIGNIFICANT CHANGE UP (ref 0–8)
NRBC # BLD: 0 /100 — SIGNIFICANT CHANGE UP (ref 0–0)
PHOSPHATE SERPL-MCNC: 3.3 MG/DL — SIGNIFICANT CHANGE UP (ref 2.5–4.5)
PLAT MORPH BLD: NORMAL — SIGNIFICANT CHANGE UP
PLATELET # BLD AUTO: 393 K/UL — SIGNIFICANT CHANGE UP (ref 150–400)
POIKILOCYTOSIS BLD QL AUTO: SLIGHT — SIGNIFICANT CHANGE UP
POLYCHROMASIA BLD QL SMEAR: SLIGHT — SIGNIFICANT CHANGE UP
POTASSIUM SERPL-MCNC: 3.3 MMOL/L — LOW (ref 3.5–5.3)
POTASSIUM SERPL-SCNC: 3.3 MMOL/L — LOW (ref 3.5–5.3)
RBC # BLD: 3.76 M/UL — LOW (ref 3.8–5.2)
RBC # FLD: 12.8 % — SIGNIFICANT CHANGE UP (ref 10.3–14.5)
RBC BLD AUTO: ABNORMAL
SODIUM SERPL-SCNC: 139 MMOL/L — SIGNIFICANT CHANGE UP (ref 135–145)
SPHEROCYTES BLD QL SMEAR: SLIGHT — SIGNIFICANT CHANGE UP
VARIANT LYMPHS # BLD: 2 % — SIGNIFICANT CHANGE UP (ref 0–6)
WBC # BLD: 8.02 K/UL — SIGNIFICANT CHANGE UP (ref 3.8–10.5)
WBC # FLD AUTO: 8.02 K/UL — SIGNIFICANT CHANGE UP (ref 3.8–10.5)

## 2020-03-31 RX ORDER — POTASSIUM CHLORIDE 20 MEQ
40 PACKET (EA) ORAL EVERY 4 HOURS
Refills: 0 | Status: COMPLETED | OUTPATIENT
Start: 2020-03-31 | End: 2020-03-31

## 2020-03-31 RX ADMIN — HEPARIN SODIUM 5000 UNIT(S): 5000 INJECTION INTRAVENOUS; SUBCUTANEOUS at 13:30

## 2020-03-31 RX ADMIN — Medication 2: at 13:31

## 2020-03-31 RX ADMIN — HEPARIN SODIUM 5000 UNIT(S): 5000 INJECTION INTRAVENOUS; SUBCUTANEOUS at 06:06

## 2020-03-31 RX ADMIN — Medication 5 MILLIGRAM(S): at 00:30

## 2020-03-31 RX ADMIN — Medication 3: at 08:56

## 2020-03-31 RX ADMIN — Medication 5 MILLIGRAM(S): at 13:28

## 2020-03-31 RX ADMIN — DORZOLAMIDE HYDROCHLORIDE TIMOLOL MALEATE 1 DROP(S): 20; 5 SOLUTION/ DROPS OPHTHALMIC at 06:05

## 2020-03-31 RX ADMIN — Medication 5 MILLIGRAM(S): at 18:34

## 2020-03-31 RX ADMIN — Medication 1: at 17:44

## 2020-03-31 RX ADMIN — PANTOPRAZOLE SODIUM 40 MILLIGRAM(S): 20 TABLET, DELAYED RELEASE ORAL at 13:27

## 2020-03-31 RX ADMIN — Medication 5 MILLIGRAM(S): at 06:06

## 2020-03-31 RX ADMIN — Medication 200 MILLIGRAM(S): at 09:05

## 2020-03-31 RX ADMIN — DORZOLAMIDE HYDROCHLORIDE TIMOLOL MALEATE 1 DROP(S): 20; 5 SOLUTION/ DROPS OPHTHALMIC at 18:33

## 2020-03-31 RX ADMIN — CLOPIDOGREL BISULFATE 75 MILLIGRAM(S): 75 TABLET, FILM COATED ORAL at 13:27

## 2020-03-31 NOTE — PROGRESS NOTE ADULT - SUBJECTIVE AND OBJECTIVE BOX
PGY 1 Note discussed with supervising resident and primary attending    Patient is a 86y old  Female who presents with a chief complaint of Fall, Pneumonia (30 Mar 2020 11:25)    INTERVAL HPI/OVERNIGHT EVENTS: Patient seen and examined at the bedside. No acute events overnight. Patient saturating at 96% on room air. Continues to refuse to eat and PO meds. Not verbal to questioning but is awake and in no apparent distress. Family looking for LTC placement.     MEDICATIONS  (STANDING):  ascorbic acid 500 milliGRAM(s) Oral two times a day  atorvastatin 20 milliGRAM(s) Oral at bedtime  clopidogrel Tablet 75 milliGRAM(s) Oral daily  donepezil 5 milliGRAM(s) Oral at bedtime  dorzolamide 2%/timolol 0.5% Ophthalmic Solution 1 Drop(s) Both EYES two times a day  heparin  Injectable 5000 Unit(s) SubCutaneous every 8 hours  hydroxychloroquine 200 milliGRAM(s) Oral every 12 hours  hydroxychloroquine   Oral   insulin lispro (HumaLOG) corrective regimen sliding scale   SubCutaneous three times a day before meals  latanoprost 0.005% Ophthalmic Solution 1 Drop(s) Both EYES at bedtime  levothyroxine 100 MICROGram(s) Oral daily  losartan 50 milliGRAM(s) Oral daily  metoprolol tartrate Injectable 5 milliGRAM(s) IV Push every 6 hours  pantoprazole  Injectable 40 milliGRAM(s) IV Push daily    __________________________________________________  REVIEW OF SYSTEMS:  Unable to obtain due to patient being nonverbal at this time      Vital Signs Last 24 Hrs  T(C): 36.9 (31 Mar 2020 07:12), Max: 37.1 (30 Mar 2020 12:00)  T(F): 98.4 (31 Mar 2020 07:12), Max: 98.8 (30 Mar 2020 12:00)  HR: 100 (31 Mar 2020 07:12) (94 - 116)  BP: 156/67 (31 Mar 2020 07:12) (156/67 - 190/94)  RR: 18 (31 Mar 2020 07:12) (16 - 20)  SpO2: 96% (31 Mar 2020 07:12) (86% - 96%)    ________________________________________________  PHYSICAL EXAM:  GENERAL: Patient seen resting in bed and in no apparent distress  HEENT: Normocephalic; conjunctivae and sclerae clear   NECK: supple  CHEST/LUNG: Clear to auscultation   HEART: S1 S2, regular; no murmurs  ABDOMEN: Soft, Nontender, Nondistended; Bowel sounds present  EXTREMITIES: no edema; no calf tenderness  SKIN: warm and dry  NERVOUS SYSTEM: Awake and alert; nonresponsive; no new deficits    _________________________________________________  LABS:    No new labs       CAPILLARY BLOOD GLUCOSE    POCT Blood Glucose.: 261 mg/dL (31 Mar 2020 08:23)  POCT Blood Glucose.: 225 mg/dL (30 Mar 2020 20:47)  POCT Blood Glucose.: 225 mg/dL (30 Mar 2020 16:20)  POCT Blood Glucose.: 258 mg/dL (30 Mar 2020 11:31)      RADIOLOGY & ADDITIONAL TESTS:    Imaging Personally Reviewed:  YES     No new imaging     Consultant(s) Notes Reviewed:   YES    Care Discussed with Consultants :     Plan of care was discussed with patient and /or primary care giver; all questions and concerns were addressed and care was aligned with patient's wishes.

## 2020-03-31 NOTE — DIETITIAN INITIAL EVALUATION ADULT. - ADD RECOMMEND
Spoke to family ( Grandson) at [phone] Spoke to family ( Grandson) at [phone]; Swallow evaluation as medically feasible ( if fluid consistency can be upgraded)

## 2020-03-31 NOTE — DIETITIAN INITIAL EVALUATION ADULT. - FACTORS AFF FOOD INTAKE
Buddhism/ethnic/cultural/personal food preferences/difficulty chewing/difficulty swallowing/difficulty with food procurement/preparation/acute on chronic comorbidities, acute illness with COVID19 pneumonia; poor dentition, unable to wear ill-fitting dentures,/other (specify)/change in mental status/difficulty feeding self

## 2020-03-31 NOTE — DIETITIAN INITIAL EVALUATION ADULT. - PERTINENT MEDS FT
MEDICATIONS  (STANDING):  ascorbic acid 500 milliGRAM(s) Oral two times a day  atorvastatin 20 milliGRAM(s) Oral at bedtime  clopidogrel Tablet 75 milliGRAM(s) Oral daily  donepezil 5 milliGRAM(s) Oral at bedtime  dorzolamide 2%/timolol 0.5% Ophthalmic Solution 1 Drop(s) Both EYES two times a day  heparin  Injectable 5000 Unit(s) SubCutaneous every 8 hours  hydroxychloroquine 200 milliGRAM(s) Oral every 12 hours  hydroxychloroquine   Oral   insulin lispro (HumaLOG) corrective regimen sliding scale   SubCutaneous three times a day before meals  latanoprost 0.005% Ophthalmic Solution 1 Drop(s) Both EYES at bedtime  levothyroxine 100 MICROGram(s) Oral daily  losartan 50 milliGRAM(s) Oral daily  metoprolol tartrate Injectable 5 milliGRAM(s) IV Push every 6 hours  pantoprazole  Injectable 40 milliGRAM(s) IV Push daily

## 2020-03-31 NOTE — DIETITIAN INITIAL EVALUATION ADULT. - 20 CAL FROM
[No Acute Distress] : no acute distress 873 [Well Nourished] : well nourished [Well Developed] : well developed [Well-Appearing] : well-appearing [Normal Sclera/Conjunctiva] : normal sclera/conjunctiva [PERRL] : pupils equal round and reactive to light [Normal Outer Ear/Nose] : the outer ears and nose were normal in appearance [EOMI] : extraocular movements intact [Normal Oropharynx] : the oropharynx was normal [No JVD] : no jugular venous distention [No Lymphadenopathy] : no lymphadenopathy [Supple] : supple [Thyroid Normal, No Nodules] : the thyroid was normal and there were no nodules present [No Respiratory Distress] : no respiratory distress  [No Accessory Muscle Use] : no accessory muscle use [Normal Rate] : normal rate  [Clear to Auscultation] : lungs were clear to auscultation bilaterally [Regular Rhythm] : with a regular rhythm [Normal S1, S2] : normal S1 and S2 [No Murmur] : no murmur heard [No Carotid Bruits] : no carotid bruits [No Varicosities] : no varicosities [Pedal Pulses Present] : the pedal pulses are present [No Abdominal Bruit] : a ~M bruit was not heard ~T in the abdomen [No Edema] : there was no peripheral edema [No Palpable Aorta] : no palpable aorta [Soft] : abdomen soft [No Extremity Clubbing/Cyanosis] : no extremity clubbing/cyanosis [Non-distended] : non-distended [Non Tender] : non-tender [No HSM] : no HSM [No Masses] : no abdominal mass palpated [Normal Bowel Sounds] : normal bowel sounds [Normal Posterior Cervical Nodes] : no posterior cervical lymphadenopathy [Normal Anterior Cervical Nodes] : no anterior cervical lymphadenopathy [No CVA Tenderness] : no CVA  tenderness [No Spinal Tenderness] : no spinal tenderness [No Joint Swelling] : no joint swelling [Grossly Normal Strength/Tone] : grossly normal strength/tone [No Rash] : no rash [Coordination Grossly Intact] : coordination grossly intact [No Focal Deficits] : no focal deficits [Normal Gait] : normal gait [Deep Tendon Reflexes (DTR)] : deep tendon reflexes were 2+ and symmetric [Normal Affect] : the affect was normal [Normal Insight/Judgement] : insight and judgment were intact

## 2020-03-31 NOTE — DIETITIAN INITIAL EVALUATION ADULT. - NS FNS WEIGHT USED FOR CALC
Ht=4' 11" ( per family)    IBW=97 lb   Admission ma=719 lb?   Current va=139.9 lb 3/27/2020; 95.2 lb 3/31/2020    BMI=19.2    Wt changes may due to scale variance/ideal

## 2020-03-31 NOTE — DIETITIAN INITIAL EVALUATION ADULT. - PERTINENT LABORATORY DATA
03-31 Na139 mmol/L Glu 244 mg/dL<H> K+ 3.3 mmol/L<L> Cr  1.01 mg/dL BUN 18 mg/dL   03-31 Phos 3.3 mg/dL   03-25 Alb 2.6 g/dL<L>     03-27 [id number] 7.5 %<H>

## 2020-03-31 NOTE — DIETITIAN INITIAL EVALUATION ADULT. - DIET TYPE
consistent carbohydrate (evening snack)/Honey Consistency, Lactovegetarian, Add Ensure Pudding 120 ml x tid ( 510 kcal 12 g protein ) if medically feasible/pureed on puree/soft food at home, small frequent meals, Lactovegetarian per family, was eating ok prior to 2 to 3d PTA

## 2020-03-31 NOTE — DIETITIAN INITIAL EVALUATION ADULT. - ETIOLOGY
advanced age with acute illness with COVID19 pneumonia, cognitive deficit with dementia; Cultural/ethnical food preferences

## 2020-03-31 NOTE — DIETITIAN INITIAL EVALUATION ADULT. - OTHER INFO
Pt lives home with family, confused with dementia, COVID19+timothy, in airborne/contact isolation room, spoke to grandson JeffersonBertrand at [phone]; decreased intake x 2 to 3d PTA, even with puree food, Ensure supplement, denied recent wt changes; poor intake in-house, agitated and refusing to eat at times per nursing; family wants to bring ethnical food from home, discussed with RN, informed family, to follow-up with nursing; Discussed with MD/RN; Pending discharge planning to skilled nursing facility for long term care when medically ready  per team Pt lives home with family, confused with dementia, COVID19+timothy, in airborne/contact isolation room, spoke to grandson JeffersonBertrand at [phone]; decreased intake x 2 to 3d PTA, even with puree food, Ensure supplement, denied recent wt changes; poor intake in-house, agitated and refusing to eat at times per nursing; family wants to bring ethnical food from home, discussed with RN, informed family, to follow-up with nursing; on Honey Thicken Liquid, concerning for pneumonia; Pending discharge planning to skilled nursing facility for long term care when medically ready per team; Discussed with MD/RN

## 2020-04-01 LAB
ANION GAP SERPL CALC-SCNC: 10 MMOL/L — SIGNIFICANT CHANGE UP (ref 5–17)
BASOPHILS # BLD AUTO: 0.02 K/UL — SIGNIFICANT CHANGE UP (ref 0–0.2)
BASOPHILS NFR BLD AUTO: 0.2 % — SIGNIFICANT CHANGE UP (ref 0–2)
BUN SERPL-MCNC: 22 MG/DL — HIGH (ref 7–18)
CALCIUM SERPL-MCNC: 9.5 MG/DL — SIGNIFICANT CHANGE UP (ref 8.4–10.5)
CHLORIDE SERPL-SCNC: 110 MMOL/L — HIGH (ref 96–108)
CO2 SERPL-SCNC: 23 MMOL/L — SIGNIFICANT CHANGE UP (ref 22–31)
CREAT SERPL-MCNC: 1.03 MG/DL — SIGNIFICANT CHANGE UP (ref 0.5–1.3)
CULTURE RESULTS: SIGNIFICANT CHANGE UP
CULTURE RESULTS: SIGNIFICANT CHANGE UP
EOSINOPHIL # BLD AUTO: 0.01 K/UL — SIGNIFICANT CHANGE UP (ref 0–0.5)
EOSINOPHIL NFR BLD AUTO: 0.1 % — SIGNIFICANT CHANGE UP (ref 0–6)
GLUCOSE BLDC GLUCOMTR-MCNC: 148 MG/DL — HIGH (ref 70–99)
GLUCOSE BLDC GLUCOMTR-MCNC: 178 MG/DL — HIGH (ref 70–99)
GLUCOSE BLDC GLUCOMTR-MCNC: 241 MG/DL — HIGH (ref 70–99)
GLUCOSE BLDC GLUCOMTR-MCNC: 253 MG/DL — HIGH (ref 70–99)
GLUCOSE SERPL-MCNC: 223 MG/DL — HIGH (ref 70–99)
HCT VFR BLD CALC: 34.7 % — SIGNIFICANT CHANGE UP (ref 34.5–45)
HGB BLD-MCNC: 11.4 G/DL — LOW (ref 11.5–15.5)
IMM GRANULOCYTES NFR BLD AUTO: 0.8 % — SIGNIFICANT CHANGE UP (ref 0–1.5)
LYMPHOCYTES # BLD AUTO: 1.05 K/UL — SIGNIFICANT CHANGE UP (ref 1–3.3)
LYMPHOCYTES # BLD AUTO: 11.5 % — LOW (ref 13–44)
MAGNESIUM SERPL-MCNC: 2.5 MG/DL — SIGNIFICANT CHANGE UP (ref 1.6–2.6)
MCHC RBC-ENTMCNC: 28.9 PG — SIGNIFICANT CHANGE UP (ref 27–34)
MCHC RBC-ENTMCNC: 32.9 GM/DL — SIGNIFICANT CHANGE UP (ref 32–36)
MCV RBC AUTO: 87.8 FL — SIGNIFICANT CHANGE UP (ref 80–100)
MONOCYTES # BLD AUTO: 0.55 K/UL — SIGNIFICANT CHANGE UP (ref 0–0.9)
MONOCYTES NFR BLD AUTO: 6 % — SIGNIFICANT CHANGE UP (ref 2–14)
NEUTROPHILS # BLD AUTO: 7.47 K/UL — HIGH (ref 1.8–7.4)
NEUTROPHILS NFR BLD AUTO: 81.4 % — HIGH (ref 43–77)
NRBC # BLD: 0 /100 WBCS — SIGNIFICANT CHANGE UP (ref 0–0)
PHOSPHATE SERPL-MCNC: 3.2 MG/DL — SIGNIFICANT CHANGE UP (ref 2.5–4.5)
PLATELET # BLD AUTO: 455 K/UL — HIGH (ref 150–400)
POTASSIUM SERPL-MCNC: 3.8 MMOL/L — SIGNIFICANT CHANGE UP (ref 3.5–5.3)
POTASSIUM SERPL-SCNC: 3.8 MMOL/L — SIGNIFICANT CHANGE UP (ref 3.5–5.3)
RBC # BLD: 3.95 M/UL — SIGNIFICANT CHANGE UP (ref 3.8–5.2)
RBC # FLD: 12.8 % — SIGNIFICANT CHANGE UP (ref 10.3–14.5)
SODIUM SERPL-SCNC: 143 MMOL/L — SIGNIFICANT CHANGE UP (ref 135–145)
SPECIMEN SOURCE: SIGNIFICANT CHANGE UP
SPECIMEN SOURCE: SIGNIFICANT CHANGE UP
WBC # BLD: 9.17 K/UL — SIGNIFICANT CHANGE UP (ref 3.8–10.5)
WBC # FLD AUTO: 9.17 K/UL — SIGNIFICANT CHANGE UP (ref 3.8–10.5)

## 2020-04-01 RX ADMIN — Medication 5 MILLIGRAM(S): at 17:22

## 2020-04-01 RX ADMIN — PANTOPRAZOLE SODIUM 40 MILLIGRAM(S): 20 TABLET, DELAYED RELEASE ORAL at 11:48

## 2020-04-01 RX ADMIN — HEPARIN SODIUM 5000 UNIT(S): 5000 INJECTION INTRAVENOUS; SUBCUTANEOUS at 06:34

## 2020-04-01 RX ADMIN — Medication 5 MILLIGRAM(S): at 00:44

## 2020-04-01 RX ADMIN — Medication 3: at 11:48

## 2020-04-01 RX ADMIN — HEPARIN SODIUM 5000 UNIT(S): 5000 INJECTION INTRAVENOUS; SUBCUTANEOUS at 12:05

## 2020-04-01 RX ADMIN — DORZOLAMIDE HYDROCHLORIDE TIMOLOL MALEATE 1 DROP(S): 20; 5 SOLUTION/ DROPS OPHTHALMIC at 06:33

## 2020-04-01 RX ADMIN — Medication 5 MILLIGRAM(S): at 23:42

## 2020-04-01 RX ADMIN — Medication 2: at 09:26

## 2020-04-01 RX ADMIN — Medication 5 MILLIGRAM(S): at 06:33

## 2020-04-01 RX ADMIN — LATANOPROST 1 DROP(S): 0.05 SOLUTION/ DROPS OPHTHALMIC; TOPICAL at 21:31

## 2020-04-01 RX ADMIN — HEPARIN SODIUM 5000 UNIT(S): 5000 INJECTION INTRAVENOUS; SUBCUTANEOUS at 00:42

## 2020-04-01 RX ADMIN — Medication 5 MILLIGRAM(S): at 11:48

## 2020-04-01 RX ADMIN — HEPARIN SODIUM 5000 UNIT(S): 5000 INJECTION INTRAVENOUS; SUBCUTANEOUS at 21:27

## 2020-04-01 RX ADMIN — LATANOPROST 1 DROP(S): 0.05 SOLUTION/ DROPS OPHTHALMIC; TOPICAL at 00:42

## 2020-04-01 NOTE — PROGRESS NOTE ADULT - SUBJECTIVE AND OBJECTIVE BOX
Patient is a 86y old  Female who presents with a chief complaint of Fall, Pneumonia (30 Mar 2020 11:25)    INTERVAL HPI/OVERNIGHT EVENTS: Patient seen and examined at the bedside. No acute events overnight. Patient saturating at 96% on room air. Continues to refuse to eat and PO meds. Not verbal to questioning but is awake and in no apparent distress. Family looking for LTC placement.     MEDICATIONS  (STANDING):  ascorbic acid 500 milliGRAM(s) Oral two times a day  atorvastatin 20 milliGRAM(s) Oral at bedtime  clopidogrel Tablet 75 milliGRAM(s) Oral daily  donepezil 5 milliGRAM(s) Oral at bedtime  dorzolamide 2%/timolol 0.5% Ophthalmic Solution 1 Drop(s) Both EYES two times a day  heparin  Injectable 5000 Unit(s) SubCutaneous every 8 hours  hydroxychloroquine 200 milliGRAM(s) Oral every 12 hours  hydroxychloroquine   Oral   insulin lispro (HumaLOG) corrective regimen sliding scale   SubCutaneous three times a day before meals  latanoprost 0.005% Ophthalmic Solution 1 Drop(s) Both EYES at bedtime  levothyroxine 100 MICROGram(s) Oral daily  losartan 50 milliGRAM(s) Oral daily  metoprolol tartrate Injectable 5 milliGRAM(s) IV Push every 6 hours  pantoprazole  Injectable 40 milliGRAM(s) IV Push daily    MEDICATIONS  (PRN):    REVIEW OF SYSTEMS:  Unable to obtain due to patient being nonverbal at this time    Vital Signs Last 24 Hrs  T(C): 36.5 (2020 13:48), Max: 36.9 (31 Mar 2020 17:00)  T(F): 97.7 (2020 13:48), Max: 98.4 (31 Mar 2020 17:00)  HR: 92 (2020 13:48) (89 - 105)  BP: 167/82 (2020 13:48) (142/92 - 184/86)  BP(mean): --  RR: 18 (2020 13:48) (16 - 20)  SpO2: 96% (2020 13:48) (86% - 97%)  ________________________________________________  PHYSICAL EXAM:  GENERAL: Patient seen resting in bed and in no apparent distress  HEENT: Normocephalic; conjunctivae and sclerae clear   NECK: supple  CHEST/LUNG: Clear to auscultation   HEART: S1 S2, regular; no murmurs  ABDOMEN: Soft, Nontender, Nondistended; Bowel sounds present  EXTREMITIES: no edema; no calf tenderness  SKIN: warm and dry  NERVOUS SYSTEM: Awake and alert; nonresponsive; no new deficits    _LABS:      143  |  110<H>  |  22<H>  ----------------------------<  223<H>  3.8   |  23  |  1.03    Ca    9.5      2020 05:26  Phos  3.2     -  Mg     2.5           Creatinine Trend: 1.03 <--, 1.01 <--, 1.01 <--, 1.00 <--, 1.07 <--, 1.23 <--, 1.51 <--                        11.4   9.17  )-----------( 455      ( 2020 05:26 )             34.7     Urine Studies:  Urinalysis Basic - ( 26 Mar 2020 05:40 )    Color: Yellow / Appearance: Slightly Turbid / S.015 / pH:   Gluc:  / Ketone: Trace  / Bili: Negative / Urobili: Negative   Blood:  / Protein: 100 / Nitrite: Negative   Leuk Esterase: Negative / RBC: 0-2 /HPF / WBC 0-2 /HPF   Sq Epi:  / Non Sq Epi: Few /HPF / Bacteria: Moderate /HPF                  Plan of care was discussed with patient and /or primary care giver; all questions and concerns were addressed and care was aligned with patient's wishes.

## 2020-04-02 LAB
ANION GAP SERPL CALC-SCNC: 11 MMOL/L — SIGNIFICANT CHANGE UP (ref 5–17)
BUN SERPL-MCNC: 30 MG/DL — HIGH (ref 7–18)
CALCIUM SERPL-MCNC: 9.5 MG/DL — SIGNIFICANT CHANGE UP (ref 8.4–10.5)
CHLORIDE SERPL-SCNC: 112 MMOL/L — HIGH (ref 96–108)
CO2 SERPL-SCNC: 22 MMOL/L — SIGNIFICANT CHANGE UP (ref 22–31)
CREAT SERPL-MCNC: 1.12 MG/DL — SIGNIFICANT CHANGE UP (ref 0.5–1.3)
GLUCOSE BLDC GLUCOMTR-MCNC: 253 MG/DL — HIGH (ref 70–99)
GLUCOSE BLDC GLUCOMTR-MCNC: 255 MG/DL — HIGH (ref 70–99)
GLUCOSE BLDC GLUCOMTR-MCNC: 90 MG/DL — SIGNIFICANT CHANGE UP (ref 70–99)
GLUCOSE BLDC GLUCOMTR-MCNC: 95 MG/DL — SIGNIFICANT CHANGE UP (ref 70–99)
GLUCOSE SERPL-MCNC: 247 MG/DL — HIGH (ref 70–99)
HCT VFR BLD CALC: 37.9 % — SIGNIFICANT CHANGE UP (ref 34.5–45)
HGB BLD-MCNC: 12.5 G/DL — SIGNIFICANT CHANGE UP (ref 11.5–15.5)
MCHC RBC-ENTMCNC: 29 PG — SIGNIFICANT CHANGE UP (ref 27–34)
MCHC RBC-ENTMCNC: 33 GM/DL — SIGNIFICANT CHANGE UP (ref 32–36)
MCV RBC AUTO: 87.9 FL — SIGNIFICANT CHANGE UP (ref 80–100)
NRBC # BLD: 0 /100 WBCS — SIGNIFICANT CHANGE UP (ref 0–0)
PLATELET # BLD AUTO: 524 K/UL — HIGH (ref 150–400)
POTASSIUM SERPL-MCNC: 3.3 MMOL/L — LOW (ref 3.5–5.3)
POTASSIUM SERPL-SCNC: 3.3 MMOL/L — LOW (ref 3.5–5.3)
RBC # BLD: 4.31 M/UL — SIGNIFICANT CHANGE UP (ref 3.8–5.2)
RBC # FLD: 13.1 % — SIGNIFICANT CHANGE UP (ref 10.3–14.5)
SODIUM SERPL-SCNC: 145 MMOL/L — SIGNIFICANT CHANGE UP (ref 135–145)
WBC # BLD: 9.47 K/UL — SIGNIFICANT CHANGE UP (ref 3.8–10.5)
WBC # FLD AUTO: 9.47 K/UL — SIGNIFICANT CHANGE UP (ref 3.8–10.5)

## 2020-04-02 RX ADMIN — HEPARIN SODIUM 5000 UNIT(S): 5000 INJECTION INTRAVENOUS; SUBCUTANEOUS at 12:54

## 2020-04-02 RX ADMIN — HEPARIN SODIUM 5000 UNIT(S): 5000 INJECTION INTRAVENOUS; SUBCUTANEOUS at 21:37

## 2020-04-02 RX ADMIN — HEPARIN SODIUM 5000 UNIT(S): 5000 INJECTION INTRAVENOUS; SUBCUTANEOUS at 05:39

## 2020-04-02 RX ADMIN — DORZOLAMIDE HYDROCHLORIDE TIMOLOL MALEATE 1 DROP(S): 20; 5 SOLUTION/ DROPS OPHTHALMIC at 18:41

## 2020-04-02 RX ADMIN — LATANOPROST 1 DROP(S): 0.05 SOLUTION/ DROPS OPHTHALMIC; TOPICAL at 21:37

## 2020-04-02 RX ADMIN — Medication 5 MILLIGRAM(S): at 12:50

## 2020-04-02 RX ADMIN — Medication 5 MILLIGRAM(S): at 18:41

## 2020-04-02 RX ADMIN — Medication 5 MILLIGRAM(S): at 23:49

## 2020-04-02 RX ADMIN — PANTOPRAZOLE SODIUM 40 MILLIGRAM(S): 20 TABLET, DELAYED RELEASE ORAL at 12:50

## 2020-04-02 RX ADMIN — Medication 5 MILLIGRAM(S): at 05:39

## 2020-04-02 RX ADMIN — DORZOLAMIDE HYDROCHLORIDE TIMOLOL MALEATE 1 DROP(S): 20; 5 SOLUTION/ DROPS OPHTHALMIC at 05:39

## 2020-04-02 RX ADMIN — Medication 3: at 09:00

## 2020-04-02 RX ADMIN — Medication 3: at 12:51

## 2020-04-02 NOTE — PROGRESS NOTE ADULT - PROBLEM SELECTOR PLAN 7
1. Imaging negative for fracture or acute processes.
imaging negative for fracture or acute processes

## 2020-04-02 NOTE — PROGRESS NOTE ADULT - PROBLEM SELECTOR PROBLEM 2
Type 2 diabetes mellitus with other specified complication, unspecified whether long term insulin use

## 2020-04-02 NOTE — PROGRESS NOTE ADULT - PROBLEM SELECTOR PLAN 3
1. c/w synthroid
TSH of 0.04  continue synthroid
TSH of 0.04  continue synthroid   patient refusing PO meds

## 2020-04-02 NOTE — PROGRESS NOTE ADULT - PROBLEM SELECTOR PLAN 2
1. Blood sugar monitoring AC/HS on low dose ISS for now
continue SSI for now
iss

## 2020-04-02 NOTE — PROGRESS NOTE ADULT - PROBLEM SELECTOR PROBLEM 7
Recurrent falls

## 2020-04-02 NOTE — PROGRESS NOTE ADULT - PROBLEM SELECTOR PLAN 5
1. c/w beta blockers, asa, statin
cont bb, asa, statin

## 2020-04-02 NOTE — PROGRESS NOTE ADULT - PROBLEM SELECTOR PLAN 6
1. Resolved with IV fluids  2. Monitor BMP
prerenal vs CKD, avoid nephrotoxins, renally dose medications  resolved with IV fluids  monitor BMP

## 2020-04-02 NOTE — PROGRESS NOTE ADULT - PROBLEM SELECTOR PLAN 10
1. Per grandsonBertrand ([phone]) and would like for patient to be DNR/DNI. MOLST form placed in chart.
Discussed GOC with grandson, Bertrand Paulino ([phone]) and would like for patient to be DNR/DNI. MOLST form placed in chart
Discussed GOC with grandson, Bertrand Paulino ([phone]) and would like for patient to be DNR/DNI. MOLST form placed in chart
Discussed GOC with grandson, Bertrand Paulino ([phone]) and would like for patient to be DNR/DNI. MOLST form placed in chart
Discussed GOC with grandson, Bertrand Paulino ([phone]) and would like for patient to be DNR/DNI. MOLST form placed in chart
Discussed GOC with grandson, Bertrand Paulino ([phone]) and would like for patient to be DNR/DNI. MOLST form placed in chart
Discussed GOC with grandson, Bertrand Paulino ([phone]) and would like for patient to be DNR/DNI. MOLST form placed in chart

## 2020-04-02 NOTE — PROGRESS NOTE ADULT - PROBLEM SELECTOR PLAN 1
1. Antibiotics d/c'd  2. COVID19 positive  3. Monitor O2 sats  4. Currently at 97% on room air  5. Family deciding for DC to LTC facility - possible [facility]   6. Day 4/5 Plaquenil
-continue empiric abx of ctx and rocephin   -COVID19 positive  -monitor O2 sats  -currently at 94% on room air
-continue empiric abx of ctx and rocephin   -COVID19 positive  -monitor O2 sats  -currently at 94% on room air
-dc abx   -COVID19 positive  -monitor O2 sats  -currently at 96% on room air  -family deciding for DC plan to LTC facility
-dc abx   -COVID19 positive  -monitor O2 sats  -currently at 96% on room air  -family deciding for DC to LTC facility  -day 3/5 plaquenil
-dc abx   -COVID19 positive  -monitor O2 sats  -currently at 96% on room air  -family deciding for DC to LTC facility  -day 3/5 plaquenil
plaquenil   -COVID19 positive  -monitor O2 sats  -currently at 94% on room air

## 2020-04-02 NOTE — PROGRESS NOTE ADULT - REASON FOR ADMISSION
Fall, Pneumonia

## 2020-04-02 NOTE — PROGRESS NOTE ADULT - PROBLEM SELECTOR PLAN 8
[facility] 
 
 
 [address]agan Patient: Felipe Jimenez MRN: [id number] JARETT:[birth year] About your hospitalization You were admitted on:  September 4, 2018 You last received care in the:  [address] You were discharged on:  September 4, 2018 Why you were hospitalized Your primary diagnosis was: Adjustment Disorder With Depressed Mood Your diagnoses also included:  Malingering, Polysubstance Abuse Follow-up Information Follow up With Details Comments Contact Info None   None ([phone]) Patient stated that they have no PCP [facility] [address] On 9/4/2018 Follow up to open access (M-F 7:30-11 & 12-4:00pm) on 9/4/2018 for case management/outpatient therapy services. *bring id, insurance card and medications. [facility]  Follow up with [facility] for case management services. [address] 
[phone] \[facility] [place] [facility]\"" Crisis Intake Line  Follow up with [address] [place] [address] [phone] to establish shelter. *provided with telephone number and process [phone] [facility] NA meeting list On 9/4/2018 Follow up with NA meeting list regarding sobriety. *[facility]  
  
Discharge Orders None A check yamilet indicates which time of day the medication should be taken. My Medications Notice You have not been prescribed any medications. Discharge Instructions DISCHARGE SUMMARY from Nurse PATIENT INSTRUCTIONS: 
 
What to do at Home: 
 
Recommended activity: Activity as tolerated, *  Please give a list of your current medications to your Primary Care Provider. *  Please update this list whenever your medications are discontinued, doses are 
    changed, or new medications (including over-the-counter products) are added.
*  Please carry medication information at all times in case of emergency situations. These are general instructions for a healthy lifestyle: No smoking/ No tobacco products/ Avoid exposure to second hand smoke Surgeon General's Warning:  Quitting smoking now greatly reduces serious risk to your health. Obesity, smoking, and sedentary lifestyle greatly increases your risk for illness A healthy diet, regular physical exercise & weight monitoring are important for maintaining a healthy lifestyle You may be retaining fluid if you have a history of heart failure or if you experience any of the following symptoms:  Weight gain of 3 pounds or more overnight or 5 pounds in a week, increased swelling in our hands or feet or shortness of breath while lying flat in bed. Please call your doctor as soon as you notice any of these symptoms; do not wait until your next office visit. Recognize signs and symptoms of STROKE: 
 
F-face looks uneven A-arms unable to move or move unevenly S-speech slurred or non-existent T-time-call 911 as soon as signs and symptoms begin-DO NOT go Back to bed or wait to see if you get better-TIME IS BRAIN. Warning Signs of HEART ATTACK Call 911 if you have these symptoms: 
? Chest discomfort. Most heart attacks involve discomfort in the center of the chest that lasts more than a few minutes, or that goes away and comes back. It can feel like uncomfortable pressure, squeezing, fullness, or pain. ? Discomfort in other areas of the upper body. Symptoms can include pain or discomfort in one or both arms, the back, neck, jaw, or stomach. ? Shortness of breath with or without chest discomfort. ? Other signs may include breaking out in a cold sweat, nausea, or lightheadedness. Don't wait more than five minutes to call [address]! Fast action can save your life.  Calling 911 is almost always the fastest way to
get lifesaving treatment. Emergency Medical Services staff can begin treatment when they arrive  up to an hour sooner than if someone gets to the hospital by car. The discharge information has been reviewed with the patient. The patient verbalized understanding. Discharge medications reviewed with the patient and appropriate educational materials and side effects teaching were provided. If I feel that I can not keep these promises and I am at risk of hurting myself or others,I will call the crisis office and speak with a crisis worker who will assist me during my crisis. [address] [place] [address] [phone] [address] [place] [address] [phone] [facility] Crisis [phone] [facility] Crisis [phone]___________________________________________________________________________________________________________________________________ Introducing hospitals & HEALTH SERVICES! Megan Segura introduces [employer] patient portal. Now you can access parts of your medical record, email your doctor's office, and request medication refills online. 1. In your internet browser, go to [email or web address]". [employer]/"[email or web address]" 2. Click on the First Time User? Click Here link in the Sign In box. You will see the New Member Sign Up page. 3. Enter your ReefEdge Access Code exactly as it appears below. You will not need to use this code after youve completed the sign-up process. If you do not sign up before the expiration date, you must request a new code. · [employer] Access Code: [email or web address] Expires: 12/3/2018  3:04 AM 
 
4. Enter the last four digits of your Social Security Number (xxxx) and Date of Birth (mm/dd/yyyy) as indicated and click Submit. You will be taken to the next sign-up page. 5. Create a MyChart ID. This will be your Xylogenicst login ID and cannot be changed, so think of one that is secure and easy to remember.
6. Create a Thoof password. You can change your password at any time. 7. Enter your Password Reset Question and Answer. This can be used at a later time if you forget your password. 8. Enter your e-mail address. You will receive e-mail notification when new information is available in [address] 9. Click Sign Up. You can now view and download portions of your medical record. 10. Click the Download Summary menu link to download a portable copy of your medical information. If you have questions, please visit the Frequently Asked Questions section of the Thoof website. Remember, Thoof is NOT to be used for urgent needs. For medical emergencies, dial 911. Now available from your iPhone and Android! Introducing Johs Warner As a [facility] patient, I wanted to make you aware of our electronic visit tool called Josh Timmy. [facility] [employer] allows you to connect within minutes with a medical provider 24 hours a day, seven days a week via a mobile device or tablet or logging into a secure website from your computer. You can access Josh Timmy from anywhere in the [place]. A virtual visit might be right for you when you have a simple condition and feel like you just dont want to get out of bed, or cant get away from work for an appointment, when your regular [facility] provider is not available (evenings, weekends or holidays), or when youre out of town and need minor care. Electronic visits cost only $49 and if the [facility] [employer]/7 provider determines a prescription is needed to treat your condition, one can be electronically transmitted to a nearby pharmacy*. Please take a moment to enroll today if you have not already done so. The enrollment process is free and takes just a few minutes. To enroll, please download the [facility] 24/7 kumar to your tablet or phone, or visit [email or web address] to enroll on your computer.
And, as an [address] [place] [address] patient with a [employer] account, the results of your visits will be scanned into your electronic medical record and your primary care provider will be able to view the scanned results. We urge you to continue to see your regular [facility] provider for your ongoing medical care. And while your primary care provider may not be the one available when you seek a Josh Molinafin virtual visit, the peace of mind you get from getting a real diagnosis real time can be priceless. For more information on Josh Molinafin, view our Frequently Asked Questions (FAQs) at [email or web address]. Sincerely, 
 
Luciano Moses MD 
Chief Medical Officer Pablo Svetlana Christopher *:  certain medications cannot be prescribed via Josh Juniorrobertfin Providers Seen During Your Hospitalization Provider Specialty Primary office phone Jesus Sampson MD Emergency Medicine [phone] Georgi Delaney MD Psychiatry [phone] Your Primary Care Physician (PCP) Primary Care Physician Office Phone Office Fax NONE ** None ** ** None ** You are allergic to the following No active allergies Recent Documentation Height Weight BMI Smoking Status 1.727 m 77.1 kg 25.85 kg/m2 Current Every Day Smoker Emergency Contacts Name Discharge Info Relation Home Work Mobile Nik Chatterjee N/A  AT THIS TIME [6] Friend [5] [phone] Patient Belongings The following personal items are in your possession at time of discharge: 
  Dental Appliances: None  Visual Aid: None      Home Medications: None   Jewelry: None  Clothing: Footwear, Hat, Pants, Shirt, Undergarments    Other Valuables: [employer], Lighter/matches, Wallet (phone cracked, wallet empty)  Personal Items Sent to Safe: cracked cell phone, liter Please provide this summary of care documentation to your next provider.
Signatures-by signing, you are acknowledging that this After Visit Summary has been reviewed with you and you have received a copy. Patient Signature:  ____________________________________________________________ Date:  ____________________________________________________________  
  
Fernando Michelle Provider Signature:  ____________________________________________________________ Date:  ____________________________________________________________
1. Ativan 0.5 mg IVP BID PRN - agitation   2. Avoid anticholinergics, benzodiazepines, limit lines/tubes/tethers/restraints, encourage frequent reorientation/reassurance by staff, encourage good sleep hygiene, adequate lighting  3. Restraints discontinued
Avoid anticholinergics, benzodiazepines, limit lines/tubes/tethers/restraints, encourage frequent reorientation/reassurance by staff, encourage good sleep hygiene, adequate lighting

## 2020-04-02 NOTE — PROGRESS NOTE ADULT - SUBJECTIVE AND OBJECTIVE BOX
This is a 87 y/o F with PMHx significant for CAD s/p PCI, DM, dementia, glaucoma presenting with generalized weakness and recurrent falls. Patient is a poor historian. History obtained from charts. Patient has had generalized weakness with recurrent falls. Per daughter, also noticed shaking chills but denied fever specifically, however febrile on admission. Admitted to Medicine with PNA and positive COVID19/ Plans for discharge to LTC facility. SpO2 of 987% on RA.     MEDICATIONS  (STANDING):  ascorbic acid 500 milliGRAM(s) Oral two times a day  atorvastatin 20 milliGRAM(s) Oral at bedtime  clopidogrel Tablet 75 milliGRAM(s) Oral daily  donepezil 5 milliGRAM(s) Oral at bedtime  dorzolamide 2%/timolol 0.5% Ophthalmic Solution 1 Drop(s) Both EYES two times a day  heparin  Injectable 5000 Unit(s) SubCutaneous every 8 hours  hydroxychloroquine 200 milliGRAM(s) Oral every 12 hours  hydroxychloroquine   Oral   insulin lispro (HumaLOG) corrective regimen sliding scale   SubCutaneous three times a day before meals  latanoprost 0.005% Ophthalmic Solution 1 Drop(s) Both EYES at bedtime  levothyroxine 100 MICROGram(s) Oral daily  losartan 50 milliGRAM(s) Oral daily  metoprolol tartrate Injectable 5 milliGRAM(s) IV Push every 6 hours  pantoprazole  Injectable 40 milliGRAM(s) IV Push daily    MEDICATIONS  (PRN):  LORazepam   Injectable 0.5 milliGRAM(s) IV Push every 12 hours PRN Agitation      __________________________________________________  REVIEW OF SYSTEMS: Non contributory       Vital Signs Last 24 Hrs  T(C): 36.6 (02 Apr 2020 15:58), Max: 36.6 (01 Apr 2020 20:33)  T(F): 97.9 (02 Apr 2020 15:58), Max: 97.9 (01 Apr 2020 20:33)  HR: 93 (02 Apr 2020 15:58) (82 - 93)  BP: 113/87 (02 Apr 2020 15:58) (113/87 - 184/79)  BP(mean): --  RR: 18 (02 Apr 2020 15:58) (18 - 18)  SpO2: 93% (02 Apr 2020 15:58) (93% - 100%)    ________________________________________________  PHYSICAL EXAM:  GENERAL: NAD  HEENT: Normocephalic;  conjunctivae and sclerae clear; moist mucous membranes;   NECK : supple  CHEST/LUNG: Clear to auscultation bilaterally with good air entry   HEART: S1 S2  regular; no murmurs, gallops or rubs  ABDOMEN: Soft, Nontender, Nondistended; Bowel sounds present  EXTREMITIES: no cyanosis; no edema; no calf tenderness  SKIN: warm and dry; no rash  NERVOUS SYSTEM:  Awake and alert    _________________________________________________  LABS:                        12.5   9.47  )-----------( 524      ( 02 Apr 2020 06:26 )             37.9     04-02    145  |  112<H>  |  30<H>  ----------------------------<  247<H>  3.3<L>   |  22  |  1.12    Ca    9.5      02 Apr 2020 06:26  Phos  3.2     04-01  Mg     2.5     04-01          CAPILLARY BLOOD GLUCOSE      POCT Blood Glucose.: 95 mg/dL (02 Apr 2020 16:44)  POCT Blood Glucose.: 253 mg/dL (02 Apr 2020 11:58)  POCT Blood Glucose.: 255 mg/dL (02 Apr 2020 08:31)  POCT Blood Glucose.: 178 mg/dL (01 Apr 2020 21:11)        RADIOLOGY & ADDITIONAL TESTS:    Imaging  Reviewed:  YES/NO    Consultant(s) Notes Reviewed:   YES/ No      Plan of care was discussed with patient and /or primary care giver; all questions and concerns were addressed

## 2020-04-02 NOTE — PROGRESS NOTE ADULT - PROBLEM SELECTOR PLAN 4
1. VS per routine   2. patient refusing PO meds  3. c/w IV lopressor
cont home meds  patient refusing PO meds  continue IV lopressor  monitor BP and adjust meds as needed
improving

## 2020-04-02 NOTE — PROGRESS NOTE ADULT - PROBLEM SELECTOR PLAN 9
1. Heparin for DVT prophylaxis
heparin for DVT prophylaxis

## 2020-04-03 VITALS
OXYGEN SATURATION: 93 % | RESPIRATION RATE: 19 BRPM | TEMPERATURE: 99 F | DIASTOLIC BLOOD PRESSURE: 92 MMHG | HEART RATE: 110 BPM | SYSTOLIC BLOOD PRESSURE: 167 MMHG

## 2020-04-03 LAB
GLUCOSE BLDC GLUCOMTR-MCNC: 142 MG/DL — HIGH (ref 70–99)
GLUCOSE BLDC GLUCOMTR-MCNC: 181 MG/DL — HIGH (ref 70–99)
GLUCOSE BLDC GLUCOMTR-MCNC: 235 MG/DL — HIGH (ref 70–99)

## 2020-04-03 PROCEDURE — 74176 CT ABD & PELVIS W/O CONTRAST: CPT

## 2020-04-03 PROCEDURE — 84443 ASSAY THYROID STIM HORMONE: CPT

## 2020-04-03 PROCEDURE — 70450 CT HEAD/BRAIN W/O DYE: CPT

## 2020-04-03 PROCEDURE — 83605 ASSAY OF LACTIC ACID: CPT

## 2020-04-03 PROCEDURE — 80048 BASIC METABOLIC PNL TOTAL CA: CPT

## 2020-04-03 PROCEDURE — 83036 HEMOGLOBIN GLYCOSYLATED A1C: CPT

## 2020-04-03 PROCEDURE — 80053 COMPREHEN METABOLIC PANEL: CPT

## 2020-04-03 PROCEDURE — 84480 ASSAY TRIIODOTHYRONINE (T3): CPT

## 2020-04-03 PROCEDURE — 85027 COMPLETE CBC AUTOMATED: CPT

## 2020-04-03 PROCEDURE — 85610 PROTHROMBIN TIME: CPT

## 2020-04-03 PROCEDURE — 71250 CT THORAX DX C-: CPT

## 2020-04-03 PROCEDURE — 87086 URINE CULTURE/COLONY COUNT: CPT

## 2020-04-03 PROCEDURE — 84436 ASSAY OF TOTAL THYROXINE: CPT

## 2020-04-03 PROCEDURE — 99285 EMERGENCY DEPT VISIT HI MDM: CPT | Mod: 25

## 2020-04-03 PROCEDURE — 85652 RBC SED RATE AUTOMATED: CPT

## 2020-04-03 PROCEDURE — 93005 ELECTROCARDIOGRAM TRACING: CPT

## 2020-04-03 PROCEDURE — 83615 LACTATE (LD) (LDH) ENZYME: CPT

## 2020-04-03 PROCEDURE — 81001 URINALYSIS AUTO W/SCOPE: CPT

## 2020-04-03 PROCEDURE — 71045 X-RAY EXAM CHEST 1 VIEW: CPT

## 2020-04-03 PROCEDURE — 87040 BLOOD CULTURE FOR BACTERIA: CPT

## 2020-04-03 PROCEDURE — 83880 ASSAY OF NATRIURETIC PEPTIDE: CPT

## 2020-04-03 PROCEDURE — 82962 GLUCOSE BLOOD TEST: CPT

## 2020-04-03 PROCEDURE — 84484 ASSAY OF TROPONIN QUANT: CPT

## 2020-04-03 PROCEDURE — 87631 RESP VIRUS 3-5 TARGETS: CPT

## 2020-04-03 PROCEDURE — 87635 SARS-COV-2 COVID-19 AMP PRB: CPT

## 2020-04-03 PROCEDURE — 82728 ASSAY OF FERRITIN: CPT

## 2020-04-03 PROCEDURE — 84100 ASSAY OF PHOSPHORUS: CPT

## 2020-04-03 PROCEDURE — 72125 CT NECK SPINE W/O DYE: CPT

## 2020-04-03 PROCEDURE — 99238 HOSP IP/OBS DSCHRG MGMT 30/<: CPT | Mod: CS

## 2020-04-03 PROCEDURE — 36415 COLL VENOUS BLD VENIPUNCTURE: CPT

## 2020-04-03 PROCEDURE — 86140 C-REACTIVE PROTEIN: CPT

## 2020-04-03 PROCEDURE — 85730 THROMBOPLASTIN TIME PARTIAL: CPT

## 2020-04-03 PROCEDURE — 83735 ASSAY OF MAGNESIUM: CPT

## 2020-04-03 RX ADMIN — PANTOPRAZOLE SODIUM 40 MILLIGRAM(S): 20 TABLET, DELAYED RELEASE ORAL at 12:40

## 2020-04-03 RX ADMIN — Medication 5 MILLIGRAM(S): at 12:20

## 2020-04-03 RX ADMIN — Medication 5 MILLIGRAM(S): at 05:47

## 2020-04-03 RX ADMIN — Medication 2: at 09:17

## 2020-04-03 RX ADMIN — HEPARIN SODIUM 5000 UNIT(S): 5000 INJECTION INTRAVENOUS; SUBCUTANEOUS at 05:47

## 2020-04-03 RX ADMIN — Medication 1: at 12:40

## 2020-04-03 RX ADMIN — HEPARIN SODIUM 5000 UNIT(S): 5000 INJECTION INTRAVENOUS; SUBCUTANEOUS at 12:42

## 2020-04-03 RX ADMIN — DORZOLAMIDE HYDROCHLORIDE TIMOLOL MALEATE 1 DROP(S): 20; 5 SOLUTION/ DROPS OPHTHALMIC at 05:47

## 2020-04-03 NOTE — DISCHARGE NOTE NURSING/CASE MANAGEMENT/SOCIAL WORK - PATIENT PORTAL LINK FT
You can access the FollowMyHealth Patient Portal offered by [facility] by registering at the following website: [email or web address]. By joining [email or web address]’s FollowMyHealth portal, you will also be able to view your health information using other applications (apps) compatible with our system.

## 2021-03-29 NOTE — PATIENT PROFILE ADULT - NSPRONUTRITIONRISK_GEN_A_NUR
Instructions from Dr. Wolff    Appointment:  In 2 months or as recommended by Dr. Wolff    Labs:  Please complete labs in one week, 4/5/2021    You will need paracentesis and CT scan of your liver.  These will be ordered for [place] - scheduling will reach out to assist with scheduling.    MEDICATIONS:    Please increase spironolactone to 100 mg daily.    Start Bumex 1 mg daily, two days after paracentesis.    You may receive a survey regarding today's appointment.  We would love to hear from you and appreciate your feedback if you could take the time to fill it out!    
No indicators present

## 2021-08-10 NOTE — ED PROVIDER NOTE - CRITICAL CARE ATTESTATION
Patient is due for a follow up appt I have personally provided the amount of critical care time documented below excluding time spent on separate procedures

## 2021-12-03 NOTE — DISCHARGE NOTE PROVIDER - HOSPITAL COURSE
Patient is an 86 year old female, with PMH of CAD s/p PCI, DM, dementia, and glaucoma, who presented with generalized weakness and recurrent falls.          Admitted for concern for COVID. Chest x-ray showed clear lungs. CT head showed no acute intracranial hemorrhage. CT cervical spine showed no acute cervical spine fracture or evidence of traumatic malalignment. Nonspecific ground less airspace opacity within the left upper lobe. COVID resulted positive. Flu and RSV were negative. Blood and urine cultures were negative. Started on IV ceftriaxone and azithromycin.     Patient had poor PO intake and refusing PO meds and was treated with IV fluids. Patient was made DNR/DNI as per family request. Patient saturated at around 94-95% on room air.         NOTE NOT COMPLETE Patient is an 86 year old female, with PMH of CAD s/p PCI, DM, dementia, and glaucoma, who presented with generalized weakness and recurrent falls.          Admitted for concern for COVID. Chest x-ray showed clear lungs. CT head showed no acute intracranial hemorrhage. CT cervical spine showed no acute cervical spine fracture or evidence of traumatic malalignment. Nonspecific ground less airspace opacity within the left upper lobe. COVID resulted positive. Flu and RSV were negative. Blood and urine cultures were negative. Started on IV ceftriaxone and azithromycin.     Patient had poor PO intake and refusing PO meds and was treated with IV fluids. Patient was made DNR/DNI as per family request. Patient saturated at around 94-95% on room air. Patient was started on course of Plaquenil. Patient did not require any oxygen supplementation during admission. Patient to be discharged to long term care facility with palliative care.         NOTE NOT COMPLETE Patient is an 86 year old female, with PMH of CAD s/p PCI, DM, dementia, and glaucoma, who presented with generalized weakness and recurrent falls.          Admitted for concern for COVID. Chest x-ray showed clear lungs. CT head showed no acute intracranial hemorrhage. CT cervical spine showed no acute cervical spine fracture or evidence of traumatic malalignment. Nonspecific ground less airspace opacity within the left upper lobe. COVID resulted positive. Flu and RSV were negative. Blood and urine cultures were negative. Started on IV ceftriaxone and azithromycin.     Patient had poor PO intake and refusing PO meds and was treated with IV fluids. Patient was made DNR/DNI as per family request. Patient saturated at around 94-95% on room air. Patient was started on course of Plaquenil. Patient did not require any oxygen supplementation during admission. Patient to be discharged today to [facility] for [facility] term care with palliative care. Skin Substitute Text: The defect edges were debeveled with a #15 scalpel blade.  Given the location of the defect, shape of the defect and the proximity to free margins a skin substitute graft was deemed most appropriate.  The graft material was trimmed to fit the size of the defect. The graft was then placed in the primary defect and oriented appropriately.

## 2023-06-19 NOTE — H&P ADULT - I WAS PHYSICALLY PRESENT FOR THE KEY PORTIONS OF THE EVALUATION AND MANAGEMENT (E/M) SERVICE PROVIDED.  I AGREE WITH THE ABOVE HISTORY, PHYSICAL, AND PLAN WHICH I HAVE REVIEWED AND EDITED WHERE APPROPRIATE
[facility] Outpatient Therapy  [address] [place] [address], [address], [address]  Phone: [phone]   Fax: [phone]    Physical Therapy Treatment Note/ Progress Report:     Date:  2023     Patient Name:  Acacia Alan    :  1935  MRN: [id number]    Medical Diagnosis Information:  Diagnosis: S42.202D closed fx proximal L humerus, S62.511D open dispalced fx R thumb   Treatment Diagnosis: S42.202D closed fx proximal L humerus, S62.511D open dispalced fx R thumb,R53.1 weakness, W19. Ezra Gaster                                           Insurance/Certification information:  PT Insurance Information: Chirag Guan  Physician Information:   Dr Lory Petty of care signed:    [x] Yes  [] No    Date of Patient follow up with Physician:      Progress Report: [x]  Yes  []  No   If Yes, Date Range for reporting period:  Beginnin23  Endin23 (3 visits)    Progress report due (10 Rx/or 30 days whichever is less): 3/1/56     Recertification due (POC duration/ or 90 days whichever is less):      Visit # Insurance Allowable Auth Needed     (3 previous) By Medical Necessity []Yes   [x]No     RESTRICTIONS/PRECAUTIONS: high falls risk  Latex Allergy:  [x]NO      []YES  Preferred Language for Healthcare:   [x]English       []other:    Functional Scale: 23 UEFS (LUE) 48/68 (12%)      Pain level: 0/10 R shoulder at rest,  0/10 L shoulder at rest    SUBJECTIVE:  Pt reports general soreness/aches due to rainy day and arthritis. Pt states that she is not having much shoulder pain at rest. Pt is on extra strength tylenol. OBJECTIVE:   Observation: Soreness into flexion above 90 degrees. Test measurements:    Exercises/Interventions: Exercises in bold performed in department today. Items not bolded are carried forward from prior visits for continuity of the record.     Exercise/Equipment Resistance/Repetitions HEP Other comments     []    Shoulder blade squeezes
Statement Selected